# Patient Record
Sex: FEMALE | Race: WHITE | NOT HISPANIC OR LATINO | Employment: FULL TIME | URBAN - METROPOLITAN AREA
[De-identification: names, ages, dates, MRNs, and addresses within clinical notes are randomized per-mention and may not be internally consistent; named-entity substitution may affect disease eponyms.]

---

## 2017-01-26 ENCOUNTER — TELEPHONE (OUTPATIENT)
Dept: OTHER | Facility: CLINIC | Age: 52
End: 2017-01-26

## 2017-01-26 NOTE — TELEPHONE ENCOUNTER
1/26/2017    Call Regarding Onboarding [employer]    Attempt 1    Message on voicemail     Comments: 0 Dep      Outreach   KV

## 2017-02-01 NOTE — TELEPHONE ENCOUNTER
Call Regarding Onboarding [employer]    Attempt 1    Message on voicemail     Comments:       Outreach   Pooja Comer

## 2017-03-07 NOTE — TELEPHONE ENCOUNTER
3/7/2017    Call Regarding Onboarding [employer]    Attempt 3    Message on voicemail     Comments: 0 -DEP      Outreach   KV

## 2017-09-16 ENCOUNTER — HEALTH MAINTENANCE LETTER (OUTPATIENT)
Age: 52
End: 2017-09-16

## 2018-04-11 ENCOUNTER — RADIANT APPOINTMENT (OUTPATIENT)
Dept: MAMMOGRAPHY | Facility: CLINIC | Age: 53
End: 2018-04-11
Payer: COMMERCIAL

## 2018-04-11 ENCOUNTER — OFFICE VISIT (OUTPATIENT)
Dept: ORTHOPEDICS | Facility: CLINIC | Age: 53
End: 2018-04-11
Payer: COMMERCIAL

## 2018-04-11 VITALS
SYSTOLIC BLOOD PRESSURE: 128 MMHG | HEIGHT: 66 IN | WEIGHT: 224 LBS | BODY MASS INDEX: 36 KG/M2 | DIASTOLIC BLOOD PRESSURE: 82 MMHG

## 2018-04-11 DIAGNOSIS — Z12.31 VISIT FOR SCREENING MAMMOGRAM: ICD-10-CM

## 2018-04-11 DIAGNOSIS — M79.674 PAIN OF TOE OF RIGHT FOOT: Primary | ICD-10-CM

## 2018-04-11 NOTE — PROGRESS NOTES
CHIEF COMPLAINT:  New Patient (Right big toe numbness)       HISTORY OF PRESENT ILLNESS  Ms. Garcia is a pleasant 52 year old year old female who presents to clinic today with decreased sensation of medial aspect of right great toe.  Decreased sensation of small area of medial aspect of great toe adjacent toenail about 3 weeks ago. Denies any trauma or inciting event. Notes that she was wondering if this could be caused by her shoe wear.  No erythema, no bruising.  +pruritus at great toe.    No low back pain.  No radicular pain extending down lower extremities.  No foot pain or pain at small area of decreased sensation.      Additional history: as documented    MEDICAL HISTORY  -Asthma, mild intermittent  -Hypertension  -Insomnia  -CHEO  -Mild depression, hx     Patient Active Problem List   Diagnosis     Ear ringing       Current Outpatient Prescriptions   Medication Sig Dispense Refill     albuterol (PROAIR HFA, PROVENTIL HFA, VENTOLIN HFA) 108 (90 BASE) MCG/ACT inhaler Inhale 2 puffs into the lungs every 6 hours       ALPRAZolam (XANAX) 0.25 MG tablet Take 0.25 mg by mouth 2 times daily       atenolol (TENORMIN) 50 MG tablet Take 75 mg by mouth daily       mirtazapine (REMERON) 15 MG tablet Take 15 mg by mouth At Bedtime       oxyCODONE-acetaminophen (PERCOCET) 5-325 MG per tablet Take 1-2 tablets by mouth every 6 hours as needed for moderate to severe pain (Patient not taking: Reported on 4/11/2018) 5 tablet 0       Allergies   Allergen Reactions     Bees Swelling     Localized swelling     Scopolamine Other (See Comments)     Blurred vision, dry mouth, motor skills affected     Sulfa Drugs Nausea and Vomiting     Ivp Dye [Contrast Dye] Rash       No family history on file.    Additional medical/Social/Surgical histories reviewed in [facility] and updated as appropriate.     REVIEW OF SYSTEMS (4/11/2018)  CONSTITUTIONAL: Denies fever and weight loss  EYES: Denies acute vision changes  ENT: Denies hearing changes or  "difficulty swallowing  CARDIAC: Denies chest pain or edema  RESPIRATORY: Denies dyspnea, cough or wheeze  GASTROINTESTINAL: Denies abdominal pain, nausea, vomiting  MUSCULOSKELETAL: See HPI  SKIN: Denies any recent rash or lesion  NEUROLOGICAL: Denies numbness or focal weakness  PSYCHIATRIC: No history of psychiatric symptoms or problems  ENDOCRINE: Diagnosis of diabetes:NO  HEMATOLOGY: Denies episodes of easy bleeding      PHYSICAL EXAM  /82  Ht 1.676 m (5' 6\")  Wt 101.6 kg (224 lb)  BMI 36.15 kg/m2    General  - normal appearance, in no obvious distress  CV  - normal pulses at posterior tib and dorsalis pedis  Pulm  - normal respiratory pattern, non-labored    Musculoskeletal - Lumbar  - Full painless lumbar ROM  - Non tender to palpation of lumbar spine  - Negative SLR test  - Negative slump test  - 2+ DTR of patella and achilles reflexes  Musculoskeletal - Right lower extremity/foot  - stance: normal gait without limp, normal stance without excessive pronation, normal heel inversion with standing heel raise, no obvious leg length discrepancy, normal heel and toe walk  - inspection: no swelling or effusion,  normal bone and joint alignment, no obvious deformity  - palpation: no bony or soft tissue tenderness. Great toe without tenderness to palpation.    - ROM: normal active and passive ROM of great and lesser toes, no pain with MT translation  - strength: 5/5 in all planes  Neuro  - 5/5 hallux extension, grossly normal coordination, normal muscle tone  - 5/5 strength of knee and ankle and hip.  - Decreased sensation at medial aspect of right great toe adjacent nail fold. Appx 1.5 x 1cm region  - Normal pressure sensation of right great toe  - Normal sensation of plantar and dorsal aspect of toe and foot. Normal sensation of medial great toe and foot proximal to region of decreased sensation.    - Monofilament test normal at 9 points of plantar aspect of right foot.  Skin  - no ecchymosis, erythema, " warmth, or induration, no obvious rash  Psych  - interactive, appropriate, normal mood and affect     ASSESSMENT & PLAN  Ms. Garcia is a 52 year old year old female who presents to clinic today with small region of medial aspect of right great toe.  Not consistent with lumbar stenosis, peripheral neuropathy.  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  Patient instructed to change shoe wear, larger toe box.  Continue to monitor toe for return of sensation vs. Progressive loss.  May use callus pad at medial aspect of toe to avoid friction.  Symptom diary for any back pain or lower extremity pain/tingling.      Diagnosis:   Sensory disturbance of right great toe.     -Follow up with Podiatry 4 weeks    It was a pleasure seeing Deana today.    Kashmir Reyes DO, CAQSM  Primary Care Sports Medicine

## 2018-04-11 NOTE — PATIENT INSTRUCTIONS
Shoewear:    Athletic shoes with good stability sole. Toe box with room for movement.  Nike, new banance, Hoka  Vionic Sandals    Symptom diary for shooting pain down leg or changes

## 2018-04-11 NOTE — MR AVS SNAPSHOT
After Visit Summary   4/11/2018    Deana Garcia    MRN: [id number]           Patient Information     Date Of Birth          1965        Visit Information        Provider Department      4/11/2018 8:50 AM Kashmir Reyes DO [facility]        Today's Diagnoses     Pain of toe of right foot    -  1      Care Instructions    Shoewear:    Athletic shoes with good stability sole. Toe box with room for movement.  Nike, new banance, Hoka  Vionic Sandals    Symptom diary for shooting pain down leg              Follow-ups after your visit        Additional Services     ORTHOPEDICS ADULT REFERRAL       Your provider has referred you to: Podiatry, Dr. Baker    Please be aware that coverage of these services is subject to the terms and limitations of your health insurance plan.  Call member services at your health plan with any benefit or coverage questions.      Please bring the following to your appointment:    >>   Any x-rays, CTs or MRIs which have been performed.  Contact the facility where they were done to arrange for  prior to your scheduled appointment.    >>   List of current medications   >>   This referral request   >>   Any documents/labs given to you for this referral                  Who to contact     Please call your clinic at [phone] to:    Ask questions about your health    Make or cancel appointments    Discuss your medicines    Learn about your test results    Speak to your doctor            Additional Information About Your Visit        [employer] Information     [employer] gives you secure access to your electronic health record. If you see a primary care provider, you can also send messages to your care team and make appointments. If you have questions, please call your primary care clinic.  If you do not have a primary care provider, please call [phone] and they will assist you.      [employer] is an electronic gateway that provides easy,  "online access to your medical records. With [employer], you can request a clinic appointment, read your test results, renew a prescription or communicate with your care team.     To access your existing account, please contact your [facility] or call [phone] for assistance.        Care EveryWhere ID     This is your Care EveryWhere ID. This could be used by other organizations to access your [facility] medical records  [id number]        Your Vitals Were     Height BMI (Body Mass Index)                1.676 m (5' 6\") 36.15 kg/m2           Blood Pressure from Last 3 Encounters:   04/11/18 128/82   07/31/15 127/87   09/10/12 116/87    Weight from Last 3 Encounters:   04/11/18 101.6 kg (224 lb)   07/31/15 96.2 kg (212 lb 1.3 oz)   09/10/12 90.7 kg (200 lb)              We Performed the Following     ORTHOPEDICS ADULT REFERRAL        Primary Care Provider Office Phone # Fax #    Cabrera Valentine [phone]       [place] [address]        Equal Access to Services     [facility]: Hadii destiny Saldana, waushada cristina, qapreetita angelicaaldylon conley, adama ambrosio . So [facility] [phone].    ATENCIÓN: Si habla español, tiene a rocha disposición servicios gratuitos de asistencia lingüística. [facility] [phone].    We comply with applicable federal civil rights laws and [place] laws. We do not discriminate on the basis of race, color, national origin, age, disability, sex, sexual orientation, or gender identity.            Thank you!     Thank you for choosing [facility]  for your care. Our goal is always to provide you with excellent care. Hearing back from our patients is one way we can continue to improve our services. Please take a few minutes to complete the written survey that you may receive in the mail after your visit with us. Thank you!           "   Your Updated Medication List - Protect others around you: Learn how to safely use, store and throw away your medicines at www.disposemymeds.org.          This list is accurate as of 4/11/18  9:38 AM.  Always use your most recent med list.                   Brand Name Dispense Instructions for use Diagnosis    albuterol 108 (90 Base) MCG/ACT Inhaler    PROAIR HFA/PROVENTIL HFA/VENTOLIN HFA     Inhale 2 puffs into the lungs every 6 hours        ALPRAZolam 0.25 MG tablet    XANAX     Take 0.25 mg by mouth 2 times daily        atenolol 50 MG tablet    TENORMIN     Take 75 mg by mouth daily        mirtazapine 15 MG tablet    REMERON     Take 15 mg by mouth At Bedtime        oxyCODONE-acetaminophen 5-325 MG per tablet    PERCOCET    5 tablet    Take 1-2 tablets by mouth every 6 hours as needed for moderate to severe pain    Endometrial polyp, Status post D&C

## 2018-04-11 NOTE — LETTER
4/11/2018       RE: Deana Garcia  [address]  [address]  [facility] [address]     Dear Colleague,    Thank you for referring your patient, Deana Garcia, to the [facility] CLINIC at [facility]. Please see a copy of my visit note below.          SPORTS & ORTHOPEDIC WALK-IN VISIT 4/11/2018    Primary Care Physician:      Numbness/lack of sensation in the skin on the top of her right big toe for the past 2 weeks. Patch is medial to the toenail. She notes she can feel pressure and has tested her blood flow which appears normal. She has dead nerve endings on her abdominal region and states it feels the same. She notices it less on the weekends when she is not on her feet as much and doesn't wear shoes as often. She has plantar fascitis and has been wearing specific shoes and wonders if that could have something to do with it.    Reason for visit:     What part of your body is injured / painful?  right big toe numbness     What caused the injury /pain? Unsure    How long ago did your injury occur or pain begin? 2 weeks     What are your most bothersome symptoms? Numbness    How would you characterize your symptom?  Numbness    What makes your symptoms better? Nothing    What makes your symptoms worse? Unsure     Have you been previously seen for this problem? No    Medical History:    Any recent changes to your medical history? No    Any new medication prescribed since last visit? No    Have you had surgery on this body part before? No    Social History:    Occupation: Office job - grad      Handedness: Right    Exercise: 1-2 days/week    Review of Systems:    Do you have fever, chills, weight loss? No    Do you have any vision problems? No    Do you have any chest pain or edema? No    Do you have any shortness of breath or wheezing?  No    Do you have stomach problems? No    Do you have any numbness or focal weakness? Yes, in  toe     Do you have diabetes? No    Do you have problems with bleeding or clotting? No, but hx of blood clots     Do you have an rashes or other skin lesions? No           CHIEF COMPLAINT:  New Patient (Right big toe numbness)       HISTORY OF PRESENT ILLNESS  Ms. Garcia is a pleasant 52 year old year old female who presents to clinic today with decreased sensation of medial aspect of right great toe.  Decreased sensation of small area of medial aspect of great toe adjacent toenail about 3 weeks ago. Denies any trauma or inciting event. Notes that she was wondering if this could be caused by her shoe wear.  No erythema, no bruising.  +pruritus at great toe.    No low back pain.  No radicular pain extending down lower extremities.  No foot pain or pain at small area of decreased sensation.      Additional history: as documented    MEDICAL HISTORY  -Asthma, mild intermittent  -Hypertension  -Insomnia  -CHEO  -Mild depression, hx     Patient Active Problem List   Diagnosis     Ear ringing       Current Outpatient Prescriptions   Medication Sig Dispense Refill     albuterol (PROAIR HFA, PROVENTIL HFA, VENTOLIN HFA) 108 (90 BASE) MCG/ACT inhaler Inhale 2 puffs into the lungs every 6 hours       ALPRAZolam (XANAX) 0.25 MG tablet Take 0.25 mg by mouth 2 times daily       atenolol (TENORMIN) 50 MG tablet Take 75 mg by mouth daily       mirtazapine (REMERON) 15 MG tablet Take 15 mg by mouth At Bedtime       oxyCODONE-acetaminophen (PERCOCET) 5-325 MG per tablet Take 1-2 tablets by mouth every 6 hours as needed for moderate to severe pain (Patient not taking: Reported on 4/11/2018) 5 tablet 0       Allergies   Allergen Reactions     Bees Swelling     Localized swelling     Scopolamine Other (See Comments)     Blurred vision, dry mouth, motor skills affected     Sulfa Drugs Nausea and Vomiting     Ivp Dye [Contrast Dye] Rash       No family history on file.    Additional medical/Social/Surgical histories reviewed in [facility] and  "updated as appropriate.     REVIEW OF SYSTEMS (4/11/2018)  CONSTITUTIONAL: Denies fever and weight loss  EYES: Denies acute vision changes  ENT: Denies hearing changes or difficulty swallowing  CARDIAC: Denies chest pain or edema  RESPIRATORY: Denies dyspnea, cough or wheeze  GASTROINTESTINAL: Denies abdominal pain, nausea, vomiting  MUSCULOSKELETAL: See HPI  SKIN: Denies any recent rash or lesion  NEUROLOGICAL: Denies numbness or focal weakness  PSYCHIATRIC: No history of psychiatric symptoms or problems  ENDOCRINE: Diagnosis of diabetes:NO  HEMATOLOGY: Denies episodes of easy bleeding      PHYSICAL EXAM  /82  Ht 1.676 m (5' 6\")  Wt 101.6 kg (224 lb)  BMI 36.15 kg/m2    General  - normal appearance, in no obvious distress  CV  - normal pulses at posterior tib and dorsalis pedis  Pulm  - normal respiratory pattern, non-labored    Musculoskeletal - Lumbar  - Full painless lumbar ROM  - Non tender to palpation of lumbar spine  - Negative SLR test  - Negative slump test  - 2+ DTR of patella and achilles reflexes  Musculoskeletal - Right lower extremity/foot  - stance: normal gait without limp, normal stance without excessive pronation, normal heel inversion with standing heel raise, no obvious leg length discrepancy, normal heel and toe walk  - inspection: no swelling or effusion,  normal bone and joint alignment, no obvious deformity  - palpation: no bony or soft tissue tenderness. Great toe without tenderness to palpation.    - ROM: normal active and passive ROM of great and lesser toes, no pain with MT translation  - strength: 5/5 in all planes  Neuro  - 5/5 hallux extension, grossly normal coordination, normal muscle tone  - 5/5 strength of knee and ankle and hip.  - Decreased sensation at medial aspect of right great toe adjacent nail fold. Appx 1.5 x 1cm region  - Normal pressure sensation of right great toe  - Normal sensation of plantar and dorsal aspect of toe and foot. Normal sensation of medial " great toe and foot proximal to region of decreased sensation.    - Monofilament test normal at 9 points of plantar aspect of right foot.  Skin  - no ecchymosis, erythema, warmth, or induration, no obvious rash  Psych  - interactive, appropriate, normal mood and affect     ASSESSMENT & PLAN  Ms. Garcia is a 52 year old year old female who presents to clinic today with small region of medial aspect of right great toe.  Not consistent with lumbar stenosis, peripheral neuropathy.  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  Patient instructed to change shoe wear, larger toe box.  Continue to monitor toe for return of sensation vs. Progressive loss.  May use callus pad at medial aspect of toe to avoid friction.  Symptom diary for any back pain or lower extremity pain/tingling.      Diagnosis:   Sensory disturbance of right great toe.     -Follow up with Podiatry 4 weeks    It was a pleasure seeing Deana today.    Kashmir Reyes DO, CAQSM  Primary Care Sports Medicine

## 2018-04-11 NOTE — PROGRESS NOTES
SPORTS & ORTHOPEDIC WALK-IN VISIT 4/11/2018    Primary Care Physician:      Numbness/lack of sensation in the skin on the top of her right big toe for the past 2 weeks. Patch is medial to the toenail. She notes she can feel pressure and has tested her blood flow which appears normal. She has dead nerve endings on her abdominal region and states it feels the same. She notices it less on the weekends when she is not on her feet as much and doesn't wear shoes as often. She has plantar fascitis and has been wearing specific shoes and wonders if that could have something to do with it.    Reason for visit:     What part of your body is injured / painful?  right big toe numbness     What caused the injury /pain? Unsure    How long ago did your injury occur or pain begin? 2 weeks     What are your most bothersome symptoms? Numbness    How would you characterize your symptom?  Numbness    What makes your symptoms better? Nothing    What makes your symptoms worse? Unsure     Have you been previously seen for this problem? No    Medical History:    Any recent changes to your medical history? No    Any new medication prescribed since last visit? No    Have you had surgery on this body part before? No    Social History:    Occupation: Office job - grad      Handedness: Right    Exercise: 1-2 days/week    Review of Systems:    Do you have fever, chills, weight loss? No    Do you have any vision problems? No    Do you have any chest pain or edema? No    Do you have any shortness of breath or wheezing?  No    Do you have stomach problems? No    Do you have any numbness or focal weakness? Yes, in toe     Do you have diabetes? No    Do you have problems with bleeding or clotting? No, but hx of blood clots     Do you have an rashes or other skin lesions? No

## 2018-09-09 ENCOUNTER — NURSE TRIAGE (OUTPATIENT)
Dept: NURSING | Facility: CLINIC | Age: 53
End: 2018-09-09

## 2018-09-09 NOTE — TELEPHONE ENCOUNTER
"    Reason for Disposition    [1] Redness or red streak around the injection site AND [2] begins > 48 hours after shot AND [3] no fever  (Exception: red area < 1 inch or 2.5 cm wide)    Additional Information    Negative: [1] Difficulty with breathing or swallowing AND [2] starts within 2 hours after injection    Negative: Difficult to awaken or acting confused (disoriented, slurred speech)    Negative: Unresponsive, passed out, or very weak    Negative: Sounds like a life-threatening emergency to the triager    Negative: Fever > 104 F (40 C)    Negative: [1] Fever > 101 F (38.3 C) AND [2] age > 60    Negative: [1] Fever > 101 F (38.3 C) AND [2] bedridden (e.g., nursing home patient, CVA, chronic illness, recovering from surgery)    Negative: [1] Fever > 100.5 F (38.1 C) AND [2] diabetes mellitus or weak immune system (e.g., HIV positive, cancer chemo, splenectomy, organ transplant, chronic steroids)    Negative: [1] Measles vaccine rash (onset day 6-12) AND [2] purple or blood-colored    Negative: Sounds like a severe, unusual reaction to the triager    Negative: [1] Redness or red streak around the injection site AND [2] begins > 48 hours after shot AND [3] fever    Answer Assessment - Initial Assessment Questions  1. SYMPTOMS: \"What is the main symptom?\" (e.g., redness, swelling, pain)       Redness at site, other red bumps  2. ONSET: \"When was the vaccine (shot) given?\" \"How much later did the __________ begin?\" (e.g., hours, days ago)       10 days ago  3. SEVERITY: \"How bad is it?\"       itchy  4. FEVER: \"Is there a fever?\" If so, ask: \"What is it, how was it measured, and when did it start?\"       denies  5. IMMUNIZATIONS GIVEN: \"What shots have you recently received?\"      Chicken pox  6. PAST REACTIONS: \"Have you reacted to immunizations before?\" If so, ask: \"What happened?\"      mild  7. OTHER SYMPTOMS: \"Do you have any other symptoms?\"      denies    Protocols used: IMMUNIZATION REACTIONS-ADULT-AH      "

## 2019-10-02 ENCOUNTER — TRANSFERRED RECORDS (OUTPATIENT)
Dept: HEALTH INFORMATION MANAGEMENT | Facility: CLINIC | Age: 54
End: 2019-10-02

## 2019-10-02 ENCOUNTER — MEDICAL CORRESPONDENCE (OUTPATIENT)
Dept: HEALTH INFORMATION MANAGEMENT | Facility: CLINIC | Age: 54
End: 2019-10-02

## 2019-11-05 ENCOUNTER — HEALTH MAINTENANCE LETTER (OUTPATIENT)
Age: 54
End: 2019-11-05

## 2019-11-13 ENCOUNTER — TRANSFERRED RECORDS (OUTPATIENT)
Dept: HEALTH INFORMATION MANAGEMENT | Facility: CLINIC | Age: 54
End: 2019-11-13

## 2020-01-30 ENCOUNTER — ANCILLARY PROCEDURE (OUTPATIENT)
Dept: MAMMOGRAPHY | Facility: CLINIC | Age: 55
End: 2020-01-30
Attending: INTERNAL MEDICINE
Payer: COMMERCIAL

## 2020-01-30 DIAGNOSIS — Z00.00 ENCOUNTER FOR ROUTINE ADULT HEALTH EXAMINATION WITHOUT ABNORMAL FINDINGS: ICD-10-CM

## 2020-01-30 DIAGNOSIS — Z12.31 VISIT FOR SCREENING MAMMOGRAM: ICD-10-CM

## 2020-04-24 ENCOUNTER — ANCILLARY PROCEDURE (OUTPATIENT)
Dept: ULTRASOUND IMAGING | Facility: CLINIC | Age: 55
End: 2020-04-24
Attending: OBSTETRICS & GYNECOLOGY
Payer: COMMERCIAL

## 2020-04-24 DIAGNOSIS — N92.6 IRREGULAR BLEEDING: ICD-10-CM

## 2020-09-08 ENCOUNTER — OFFICE VISIT (OUTPATIENT)
Dept: OPHTHALMOLOGY | Facility: CLINIC | Age: 55
End: 2020-09-08
Attending: OPHTHALMOLOGY
Payer: COMMERCIAL

## 2020-09-08 DIAGNOSIS — H52.13 HIGH MYOPIA, BOTH EYES: ICD-10-CM

## 2020-09-08 DIAGNOSIS — D31.31 CHOROIDAL NEVUS, RIGHT: ICD-10-CM

## 2020-09-08 DIAGNOSIS — H25.13 NUCLEAR SCLEROTIC CATARACT OF BOTH EYES: Primary | ICD-10-CM

## 2020-09-08 PROCEDURE — G0463 HOSPITAL OUTPT CLINIC VISIT: HCPCS | Mod: ZF

## 2020-09-08 PROCEDURE — 76519 ECHO EXAM OF EYE: CPT | Mod: ZF | Performed by: OPHTHALMOLOGY

## 2020-09-08 RX ORDER — FAMOTIDINE 20 MG
TABLET ORAL
COMMUNITY

## 2020-09-08 ASSESSMENT — VISUAL ACUITY
CORRECTION_TYPE: GLASSES
OD_CC+: -1
OS_CC: 20/25
OD_CC: 20/40
METHOD: SNELLEN - LINEAR
OS_CC+: -2

## 2020-09-08 ASSESSMENT — REFRACTION_WEARINGRX
OS_VBASE: DOWN
OD_VBASE: UP
OD_ADD: +2.75
OS_CYLINDER: +4.00
OD_CYLINDER: +3.00
OD_VPRISM: 1
OS_AXIS: 009
OS_ADD: +2.75
OS_SPHERE: -8.25
OS_VPRISM: 1 DOWN
OD_AXIS: 098
OD_SPHERE: -10.25

## 2020-09-08 ASSESSMENT — REFRACTION_MANIFEST
OD_SPHERE: -15.00
OD_AXIS: 098
OS_AXIS: 007
OS_CYLINDER: +4.00
OD_CYLINDER: +4.25
OS_SPHERE: -8.25

## 2020-09-08 ASSESSMENT — CONF VISUAL FIELD
METHOD: COUNTING FINGERS
OS_NORMAL: 1
OD_NORMAL: 1

## 2020-09-08 ASSESSMENT — SLIT LAMP EXAM - LIDS
COMMENTS: NORMAL
COMMENTS: NORMAL

## 2020-09-08 ASSESSMENT — TONOMETRY
OS_IOP_MMHG: 06
OD_IOP_MMHG: 08
IOP_METHOD: TONOPEN

## 2020-09-08 ASSESSMENT — EXTERNAL EXAM - RIGHT EYE: OD_EXAM: NORMAL

## 2020-09-08 ASSESSMENT — CUP TO DISC RATIO
OD_RATIO: 0.2
OS_RATIO: 0.2

## 2020-09-08 ASSESSMENT — EXTERNAL EXAM - LEFT EYE: OS_EXAM: NORMAL

## 2020-09-08 NOTE — PROGRESS NOTES
HPI:  Deana Garcia is a 54 year old female referred by Dr. Carrington for cataract evaluation. She has noticed a worsening blurring of the vision in her right eye, slowly progressive over the last 2 years or so. The blurring is more noticeable at distance than near. There is associated worsening glare with bright lights at night. Her current glasses don't help like they used to. No eye pain, redness, discharge. No flashes/floaters.    POH: Myopia, choroidal nevus right eye. No history of eye surgery or trauma  PMH: Asthma, No DM    Assessment & Plan     (H25.13) Nuclear sclerotic cataract of both eyes  (primary encounter diagnosis)  (H52.13) High myopia, both eyes  Comment: Visually significant with BCVA of 20/40 right eye and 20/25 left eye with large myopic shift right eye (SE of ~ -8.75 to -13.00). Moderately dense NS on exam.    Dilates to: 8 mm  Alpha blockers/Flomax: None  Trauma/Pseudoxfoliation: None  Fuchs dystrophy/guttae: None    Diabetes: No  Anticoagulation: None    Cyl: 3.10 @ 099 right eye, 2.82 @ 007 left eye     We discussed the risks and benefits of cataract surgery, and informed consent was obtained.  Proceed with CE/IOL right eye followed by left eye.     Surgical plan:  Topical  Aim ~ -3.00 for reading vision. She is considering toric IOL, will call to let me know.  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 Carrington's last documented exam.  Plan: Monitor        -----------------------------------------------------------------------------------    Patient disposition:   Return for scheduled procedure, or sooner as needed.    Teaching statement:  Complete documentation of historical and exam elements from today's encounter can be found in the full encounter summary report (not reduplicated in this progress note). I personally obtained the chief complaint(s) and history of present illness.  I confirmed and edited as necessary the review of systems,  past medical/surgical history, family history, social history, and examination findings as documented by others; and I examined the patient myself. I personally reviewed the relevant tests, images, and reports as documented above.     I formulated and edited as necessary the assessment and plan and discussed the findings and management plan with the patient and family.      Анна Mishra MD  Comprehensive Ophthalmology & Ocular Pathology  Department of Ophthalmology and Visual Neurosciences  [email or web address]  Pager [phone]

## 2020-09-08 NOTE — NURSING NOTE
Chief Complaints and History of Present Illnesses   Patient presents with     Cataract     Chief Complaint(s) and History of Present Illness(es)     Cataract     Laterality: right eye    Associated symptoms: blurred vision, glare and starburst    Severity: moderate    Onset: gradual    Duration: 2 years    Frequency: constant    Context: reading, night driving and computer work              Comments     For the past two years she has been experiencing decreased vision and glare.  She struggles with reading and decreased night vision.  Her right eye is significantly worse than her left eye.    Esther Goncalves, COT 9:01 AM  September 8, 2020

## 2020-09-21 ENCOUNTER — PREP FOR PROCEDURE (OUTPATIENT)
Dept: OPHTHALMOLOGY | Facility: CLINIC | Age: 55
End: 2020-09-21

## 2020-09-21 ENCOUNTER — TELEPHONE (OUTPATIENT)
Dept: OPHTHALMOLOGY | Facility: CLINIC | Age: 55
End: 2020-09-21

## 2020-09-21 NOTE — TELEPHONE ENCOUNTER
Called patient to schedule procedure with Dr. Mishra, there was no answer.  Left message with my direct line [phone]

## 2020-09-28 NOTE — TELEPHONE ENCOUNTER
Called patient to schedule procedure with Dr. Mishra, there was no answer.  Left message with my direct line [phone].

## 2020-09-29 NOTE — TELEPHONE ENCOUNTER
Called and talked with patient to schedule her for her eye procedure with Dr. Mishra.  Patient reports she is still trying to figure out what she wants to do.   Patient request to be called back in two weeks to be scheduled.

## 2020-11-22 ENCOUNTER — HEALTH MAINTENANCE LETTER (OUTPATIENT)
Age: 55
End: 2020-11-22

## 2021-05-18 NOTE — TELEPHONE ENCOUNTER
Patient called with concerns about her upcoming appointment with Dr. Mishra.    Patient is wanting to know does she still need to see Dr. Mishra before scheduling surgery.    A message has been sent to the patients care team.

## 2021-05-19 ENCOUNTER — OFFICE VISIT (OUTPATIENT)
Dept: OPHTHALMOLOGY | Facility: CLINIC | Age: 56
End: 2021-05-19
Attending: OPHTHALMOLOGY
Payer: COMMERCIAL

## 2021-05-19 DIAGNOSIS — H25.13 NUCLEAR SCLEROTIC CATARACT OF BOTH EYES: Primary | ICD-10-CM

## 2021-05-19 DIAGNOSIS — H52.223 REGULAR ASTIGMATISM OF BOTH EYES: ICD-10-CM

## 2021-05-19 PROCEDURE — 76519 ECHO EXAM OF EYE: CPT | Performed by: OPHTHALMOLOGY

## 2021-05-19 PROCEDURE — G0463 HOSPITAL OUTPT CLINIC VISIT: HCPCS

## 2021-05-19 PROCEDURE — 76519 ECHO EXAM OF EYE: CPT | Mod: 26 | Performed by: OPHTHALMOLOGY

## 2021-05-19 PROCEDURE — 99214 OFFICE O/P EST MOD 30 MIN: CPT | Performed by: OPHTHALMOLOGY

## 2021-05-19 ASSESSMENT — REFRACTION_WEARINGRX
OD_ADD: +2.75
OD_CYLINDER: +3.00
OS_ADD: +2.75
OD_VPRISM: 1
OS_VPRISM: 1 DOWN
OS_AXIS: 009
OS_SPHERE: -8.25
OD_VBASE: UP
OS_VBASE: DOWN
OD_SPHERE: -10.25
OD_AXIS: 098
OS_CYLINDER: +4.00

## 2021-05-19 ASSESSMENT — TONOMETRY
OS_IOP_MMHG: 15
OD_IOP_MMHG: 16
IOP_METHOD: TONOPEN

## 2021-05-19 ASSESSMENT — REFRACTION_MANIFEST
OS_AXIS: 009
OD_SPHERE: -10.75
OS_CYLINDER: +4.00
OD_CYLINDER: +2.50
OS_SPHERE: -8.50
OD_AXIS: 100
OS_ADD: +2.50
OD_ADD: +2.50

## 2021-05-19 ASSESSMENT — CONF VISUAL FIELD
OS_NORMAL: 1
METHOD: COUNTING FINGERS
OD_NORMAL: 1

## 2021-05-19 ASSESSMENT — CUP TO DISC RATIO
OD_RATIO: 0.2
OS_RATIO: 0.2

## 2021-05-19 ASSESSMENT — VISUAL ACUITY
OD_PH_CC+: -1
OD_PH_CC: 20/30
METHOD: SNELLEN - LINEAR
OS_CC: 20/30
OD_CC: 20/60
OD_CC+: -1
OS_PH_CC: 20/20

## 2021-05-19 ASSESSMENT — EXTERNAL EXAM - RIGHT EYE: OD_EXAM: NORMAL

## 2021-05-19 ASSESSMENT — EXTERNAL EXAM - LEFT EYE: OS_EXAM: NORMAL

## 2021-05-19 ASSESSMENT — SLIT LAMP EXAM - LIDS
COMMENTS: NORMAL
COMMENTS: NORMAL

## 2021-05-19 NOTE — NURSING NOTE
Chief Complaints and History of Present Illnesses   Patient presents with     Cataract     Chief Complaint(s) and History of Present Illness(es)     Cataract     Laterality: both eyes              Comments     Deana is here to continue care for Nuclear sclerotic cataract of both eyes. She is here for re testing and Calcs.      Shankar House COT 9:48 AM May 19, 2021

## 2021-05-19 NOTE — PROGRESS NOTES
HPI:  Deana Garcia is a 55 year old female here for repeat cataract evaluation. She meant to have surgery in the fall, but her blood pressure was elevated at her pre-op physical, and she had to delay. She has since been started on a BP medication, and at last check, her BP was improved. She has noticed continued worsening blurring of the vision in her right eye, and it is currently limiting her ability to see to work and drive. There is associated worsening glare with bright lights at night. Her current glasses don't help much at all. No eye pain, redness, discharge. No flashes/floaters.    POH: Myopia, choroidal nevus right eye. No history of eye surgery or trauma  PMH: Asthma, No DM    Assessment & Plan     (H25.13) Nuclear sclerotic cataract of both eyes  (primary encounter diagnosis)  (H52.13) High myopia, both eyes  Comment: Visually significant with BCVA of 20/40- right eye. Moderately dense NS on exam.    Dilates to: 8 mm  Alpha blockers/Flomax: None  Trauma/Pseudoxfoliation: None  Fuchs dystrophy/guttae: None    Diabetes: No  Anticoagulation: None    Cyl: 3.48 @ 101 right eye, 3.08 @ 007 left eye     We discussed the risks and benefits of cataract surgery, and informed consent was obtained.  Proceed with CE/IOL right eye followed by left eye (left eye for anisometropia)    Surgical plan:  Topical  Aim ~ -2.00 to -2.50 for reading/computer vision. She would like to proceed with faculty toric IOL in both eyes.   FACULTY    (D31.31) Choroidal nevus, right  Comment: No concerning features, stable  Plan: Monitor     -----------------------------------------------------------------------------------    Patient disposition:   Return for scheduled procedure, or sooner as needed.    Teaching statement:  Complete documentation of historical and exam elements from today's encounter can be found in the full encounter summary report (not reduplicated in this progress note). I personally obtained the chief complaint(s)  and history of present illness.  I confirmed and edited as necessary the review of systems, past medical/surgical history, family history, social history, and examination findings as documented by others; and I examined the patient myself. I personally reviewed the relevant tests, images, and reports as documented above.     I formulated and edited as necessary the assessment and plan and discussed the findings and management plan with the patient and family.      Анна Mishra MD  Comprehensive Ophthalmology & Ocular Pathology  Department of Ophthalmology and Visual Neurosciences  [email or web address]  Pager [phone]

## 2021-05-24 PROBLEM — H52.223 REGULAR ASTIGMATISM OF BOTH EYES: Status: ACTIVE | Noted: 2021-05-24

## 2021-05-24 PROBLEM — H25.13 NUCLEAR SCLEROTIC CATARACT OF BOTH EYES: Status: ACTIVE | Noted: 2021-05-24

## 2021-05-25 ENCOUNTER — TELEPHONE (OUTPATIENT)
Dept: OPHTHALMOLOGY | Facility: CLINIC | Age: 56
End: 2021-05-25

## 2021-05-25 NOTE — TELEPHONE ENCOUNTER
Spoke with patient to schedule left eye surgery with Dr. Mishra    Surgery was scheduled on 6/25 at [facility] [place]  Patient will have H&P at [facility]     Patient is aware a COVID-19 test is needed before their procedure. The test should be with-in 4 days of their procedure.   Test Details: Date 6/21 Location [facility]    Post-Op visit was scheduled on 7/5  Patient was advised a / is needed day of surgery. As well as, for 24 hours after their surgery procedure.  Surgery packet was mailed 5/25, patient has my direct contact information for any further questions [phone].

## 2021-05-27 DIAGNOSIS — Z11.59 ENCOUNTER FOR SCREENING FOR OTHER VIRAL DISEASES: ICD-10-CM

## 2021-06-14 DIAGNOSIS — Z11.59 ENCOUNTER FOR SCREENING FOR OTHER VIRAL DISEASES: ICD-10-CM

## 2021-06-14 LAB
LABORATORY COMMENT REPORT: NORMAL
SARS-COV-2 RNA RESP QL NAA+PROBE: NEGATIVE
SARS-COV-2 RNA RESP QL NAA+PROBE: NORMAL
SPECIMEN SOURCE: NORMAL
SPECIMEN SOURCE: NORMAL

## 2021-06-14 PROCEDURE — U0005 INFEC AGEN DETEC AMPLI PROBE: HCPCS | Mod: 90 | Performed by: PATHOLOGY

## 2021-06-14 PROCEDURE — U0003 INFECTIOUS AGENT DETECTION BY NUCLEIC ACID (DNA OR RNA); SEVERE ACUTE RESPIRATORY SYNDROME CORONAVIRUS 2 (SARS-COV-2) (CORONAVIRUS DISEASE [COVID-19]), AMPLIFIED PROBE TECHNIQUE, MAKING USE OF HIGH THROUGHPUT TECHNOLOGIES AS DESCRIBED BY CMS-2020-01-R: HCPCS | Mod: 90 | Performed by: PATHOLOGY

## 2021-06-16 DIAGNOSIS — H25.11 AGE-RELATED NUCLEAR CATARACT, RIGHT: Primary | ICD-10-CM

## 2021-06-16 RX ORDER — OFLOXACIN 3 MG/ML
SOLUTION/ DROPS OPHTHALMIC
Qty: 5 ML | Refills: 0 | Status: SHIPPED | OUTPATIENT
Start: 2021-06-16 | End: 2021-07-28

## 2021-06-16 RX ORDER — PREDNISOLONE ACETATE 10 MG/ML
SUSPENSION/ DROPS OPHTHALMIC
Qty: 5 ML | Refills: 1 | Status: SHIPPED | OUTPATIENT
Start: 2021-06-16 | End: 2021-07-28

## 2021-06-18 ENCOUNTER — NURSE TRIAGE (OUTPATIENT)
Dept: NURSING | Facility: CLINIC | Age: 56
End: 2021-06-18

## 2021-06-18 ENCOUNTER — TELEPHONE (OUTPATIENT)
Dept: OPHTHALMOLOGY | Facility: CLINIC | Age: 56
End: 2021-06-18

## 2021-06-18 ENCOUNTER — OFFICE VISIT (OUTPATIENT)
Dept: OPHTHALMOLOGY | Facility: CLINIC | Age: 56
End: 2021-06-18
Payer: COMMERCIAL

## 2021-06-18 ENCOUNTER — HOSPITAL ENCOUNTER (OUTPATIENT)
Facility: AMBULATORY SURGERY CENTER | Age: 56
Discharge: HOME | End: 2021-06-18
Attending: OPHTHALMOLOGY | Admitting: OPHTHALMOLOGY
Payer: COMMERCIAL

## 2021-06-18 VITALS
HEART RATE: 57 BPM | BODY MASS INDEX: 34.55 KG/M2 | RESPIRATION RATE: 16 BRPM | WEIGHT: 215 LBS | SYSTOLIC BLOOD PRESSURE: 152 MMHG | HEIGHT: 66 IN | DIASTOLIC BLOOD PRESSURE: 92 MMHG | TEMPERATURE: 97 F | OXYGEN SATURATION: 99 %

## 2021-06-18 DIAGNOSIS — H52.223 REGULAR ASTIGMATISM OF BOTH EYES: ICD-10-CM

## 2021-06-18 DIAGNOSIS — Z96.1 PSEUDOPHAKIA, RIGHT EYE: Primary | ICD-10-CM

## 2021-06-18 DIAGNOSIS — H25.11 AGE-RELATED NUCLEAR CATARACT, RIGHT: Primary | ICD-10-CM

## 2021-06-18 DIAGNOSIS — Z98.890 POSTOPERATIVE EYE STATE: ICD-10-CM

## 2021-06-18 DIAGNOSIS — H52.221 REGULAR ASTIGMATISM, RIGHT EYE: ICD-10-CM

## 2021-06-18 DIAGNOSIS — Z20.822 COVID-19 RULED OUT: Primary | ICD-10-CM

## 2021-06-18 DIAGNOSIS — H25.13 NUCLEAR SCLEROTIC CATARACT OF BOTH EYES: ICD-10-CM

## 2021-06-18 PROCEDURE — 66984 XCAPSL CTRC RMVL W/O ECP: CPT | Mod: RT

## 2021-06-18 PROCEDURE — V2599 CONTACT LENS/ES OTHER TYPE: HCPCS | Mod: DBP,RT

## 2021-06-18 PROCEDURE — 99024 POSTOP FOLLOW-UP VISIT: CPT | Mod: GC | Performed by: OPHTHALMOLOGY

## 2021-06-18 RX ORDER — BALANCED SALT SOLUTION 6.4; .75; .48; .3; 3.9; 1.7 MG/ML; MG/ML; MG/ML; MG/ML; MG/ML; MG/ML
SOLUTION OPHTHALMIC PRN
Status: DISCONTINUED | OUTPATIENT
Start: 2021-06-18 | End: 2021-06-18 | Stop reason: HOSPADM

## 2021-06-18 RX ORDER — DICLOFENAC SODIUM 1 MG/ML
1 SOLUTION/ DROPS OPHTHALMIC
Status: COMPLETED | OUTPATIENT
Start: 2021-06-18 | End: 2021-06-18

## 2021-06-18 RX ORDER — LIDOCAINE HYDROCHLORIDE 10 MG/ML
INJECTION, SOLUTION EPIDURAL; INFILTRATION; INTRACAUDAL; PERINEURAL PRN
Status: DISCONTINUED | OUTPATIENT
Start: 2021-06-18 | End: 2021-06-18 | Stop reason: HOSPADM

## 2021-06-18 RX ORDER — PROPARACAINE HYDROCHLORIDE 5 MG/ML
1 SOLUTION/ DROPS OPHTHALMIC ONCE
Status: COMPLETED | OUTPATIENT
Start: 2021-06-18 | End: 2021-06-18

## 2021-06-18 RX ORDER — TETRACAINE HYDROCHLORIDE 5 MG/ML
SOLUTION OPHTHALMIC PRN
Status: DISCONTINUED | OUTPATIENT
Start: 2021-06-18 | End: 2021-06-18 | Stop reason: HOSPADM

## 2021-06-18 RX ORDER — CYCLOPENTOLAT/TROPIC/PHENYLEPH 1%-1%-2.5%
1 DROPS (EA) OPHTHALMIC (EYE)
Status: COMPLETED | OUTPATIENT
Start: 2021-06-18 | End: 2021-06-18

## 2021-06-18 RX ORDER — OFLOXACIN 3 MG/ML
1 SOLUTION/ DROPS OPHTHALMIC
Status: COMPLETED | OUTPATIENT
Start: 2021-06-18 | End: 2021-06-18

## 2021-06-18 RX ORDER — MOXIFLOXACIN IN NACL,ISO-OS/PF 0.3MG/0.3
SYRINGE (ML) INTRAOCULAR PRN
Status: DISCONTINUED | OUTPATIENT
Start: 2021-06-18 | End: 2021-06-18 | Stop reason: HOSPADM

## 2021-06-18 RX ADMIN — OFLOXACIN 1 DROP: 3 SOLUTION/ DROPS OPHTHALMIC at 10:04

## 2021-06-18 RX ADMIN — PROPARACAINE HYDROCHLORIDE 1 DROP: 5 SOLUTION/ DROPS OPHTHALMIC at 09:53

## 2021-06-18 RX ADMIN — OFLOXACIN 1 DROP: 3 SOLUTION/ DROPS OPHTHALMIC at 09:52

## 2021-06-18 RX ADMIN — Medication 1 DROP: at 10:00

## 2021-06-18 RX ADMIN — Medication 1 DROP: at 09:52

## 2021-06-18 RX ADMIN — DICLOFENAC SODIUM 1 DROP: 1 SOLUTION/ DROPS OPHTHALMIC at 10:00

## 2021-06-18 RX ADMIN — Medication 1 DROP: at 10:04

## 2021-06-18 RX ADMIN — OFLOXACIN 1 DROP: 3 SOLUTION/ DROPS OPHTHALMIC at 10:00

## 2021-06-18 RX ADMIN — DICLOFENAC SODIUM 1 DROP: 1 SOLUTION/ DROPS OPHTHALMIC at 10:04

## 2021-06-18 RX ADMIN — DICLOFENAC SODIUM 1 DROP: 1 SOLUTION/ DROPS OPHTHALMIC at 09:52

## 2021-06-18 ASSESSMENT — VISUAL ACUITY: OD_SC: 20/50

## 2021-06-18 ASSESSMENT — EXTERNAL EXAM - RIGHT EYE: OD_EXAM: NORMAL

## 2021-06-18 ASSESSMENT — MIFFLIN-ST. JEOR: SCORE: 1576.98

## 2021-06-18 ASSESSMENT — TONOMETRY
OD_IOP_MMHG: 6
IOP_METHOD: TONOPEN

## 2021-06-18 ASSESSMENT — SLIT LAMP EXAM - LIDS: COMMENTS: NORMAL

## 2021-06-18 NOTE — TELEPHONE ENCOUNTER
----- Message from Jannet Eugene sent at 6/18/2021  7:27 AM CDT -----  Regarding: Appt 6/21 - No Covid Swab order  Hello,   In order to offer our patients the best possible experience, the lab schedule is reviewed to ensure patients have lab orders in [facility] prior to arriving at the lab.    Please have one of your team members place a Covid Swab order in Epic for this patient prior to the lab appointment date.     Thank you for your attention,   [facility] [phone]

## 2021-06-18 NOTE — PROGRESS NOTES
POD#0, status post cataract surgery, RIGHT eye    No complaints.  Denies eye pain.    Impression/Plan:  Pseudophakia, OD: POD0, good post-operative appearance. IOP reasonable.    Doing well. Start post-op drops as directed.    Eye protection at all times and eye shield at night for 1 week.    Limited activities with no exercise or heavy lifting for 1 week.    Instructed patient to contact us for decreasing vision, eye pain, new floaters or flashes of light or other concerning symptoms.    Written instructions given    Return to for left eye ce/iol and post-op check as scheduled, sooner if needed.    Paul Gallagher MD  Ophthalmology PGY4    Teaching statement:  Complete documentation of historical and exam elements from today's encounter can be found in the full encounter summary report (not reduplicated in this progress note). I personally obtained the chief complaint(s) and history of present illness.  I confirmed and edited as necessary the review of systems, past medical/surgical history, family history, social history, and examination findings as documented by others; and I examined the patient myself. I personally reviewed the relevant tests, images, and reports as documented above.     I formulated and edited as necessary the assessment and plan and discussed the findings and management plan with the patient and family.    Анна Mishra MD  Comprehensive Ophthalmology & Ocular Pathology  Department of Ophthalmology and Visual Neurosciences  [email or web address]  Pager [phone]

## 2021-06-18 NOTE — TELEPHONE ENCOUNTER
6/25/2021  LEFT EYE CATARACT REMOVAL WITH INTRAOCULAR TORIC LENS IMPLANT    Pt was getting into the car and hit the right side of her head above her right ear.     It was not very hard.   No injuries or bleeding noted.    But Pt is worried that when she hit her head.    She managed to shake the implant loose from her eye surgery.       Pt is not in any pain or bleeding noted.  But very worried she did some damages to the fresh surgery.       Pt would like a call back from the clinic.    Please call the Pt back @ [phone] for further assistance.     Thank you     Reason for Disposition    Patient wants to be seen    Additional Information    Negative: ACUTE NEUROLOGIC SYMPTOM and symptom present now    Negative: Knocked out (unconscious) > 1 minute    Negative: Seizure (convulsion) occurred (Exception: prior history of seizures and now alert and without Acute Neurologic Symptoms)    Negative: Neck pain after dangerous injury (e.g., MVA, diving, trampoline, contact sports, fall > 10 feet or 3 meters) (Exception: neck pain began > 1 hour after injury)    Negative: Major bleeding (actively dripping or spurting) that can't be stopped    Negative: Penetrating head injury (e.g., knife, gunshot wound, metal object)    Negative: Sounds like a life-threatening emergency to the triager    Negative: Recently examined and diagnosed with a concussion by a healthcare provider and has questions about concussion symptoms    Negative: Can't remember what happened (amnesia)    Negative: Vomiting once or more    Negative: Watery or blood-tinged fluid dripping from the nose or ears    Negative: ACUTE NEUROLOGIC SYMPTOM and now fine    Negative: Knocked out (unconscious) < 1 minute and now fine    Negative: Severe headache    Negative: Dangerous injury (e.g., MVA, diving, trampoline, contact sports, fall > 10 feet or 3 meters) or severe blow from hard object (e.g., golf club or baseball bat)    Negative: Large swelling or bruise > 2  inches (5 cm)    Negative: Skin is split open or gaping (length > 1/2 inch or 12 mm)    Negative: Bleeding won't stop after 10 minutes of direct pressure (using correct technique)    Negative: One or two 'black eyes' (bruising, purple color of eyelids), and onset within 24 hours of head injury    Negative: Taking Coumadin (warfarin) or other strong blood thinner, or known bleeding disorder (e.g., thrombocytopenia)    Negative: Age over 65 years with and area of head swelling or bruise    Negative: Sounds like a serious injury to the triager    Negative: Patient is confused or is an unreliable provider of information (e.g., dementia, severe intellectual disability, alcohol intoxication)    Negative: No prior tetanus shots (or is not fully vaccinated) and any wound (e.g., cut or scrape)    Negative: HIV positive or severe immunodeficiency (severely weak immune system) and DIRTY cut or scrape    Protocols used: HEAD INJURY-A-OH

## 2021-06-18 NOTE — TELEPHONE ENCOUNTER
[phone]    S/p cataract surgery today and while getting into care hit head by ear-- a bit of a jolt-- no loss of consciousness and head ok per pt    Pt concerned if implant moved    No noticed vision changes-- same amount of blurred vision    Eye is shielded    Dr. Mishra aware and will call pt to review plan    Gabriel Mendoza, RN 2:09 PM 06/18/21

## 2021-06-18 NOTE — DISCHARGE INSTRUCTIONS
[facility]  Home Care Following Your Procedure  Call a doctor if you have signs of infection (fever, growing tenderness at the surgery site, a large amount of drainage or bleeding, severe pain, foul-smelling drainage, redness, swelling).         Tylenol/Acetaminophen Consumption  To help encourage the safe use of acetaminophen, the makers of TYLENOL  have lowered the maximum daily dose for single-ingredient Extra Strength TYLENOL  (acetaminophen) products sold in the [place] from 8 pills per day (4,000 mg) to 6 pills per day (3,000 mg). The dosing interval has also changed from 2 pills every 4-6 hours to 2 pills every 6 hours.    If you feel your pain relief is insufficient, you may take Tylenol/Acetaminophen in addition to your narcotic pain medication.     Be careful not to exceed 3,000 mg of Tylenol/Acetaminophen in a 24 hour period from all sources.    If you are taking extra strength Tylenol/acetaminophen (500 mg), the maximum dose is 6 tablets in 24 hours.    If you are taking regular strength acetaminophen (325 mg), the maximum dose is 9 tablets in 24 hours.  Your doctor is:  Dr. Анна Mishra, Ophthalmology: [phone]                                    Or dial [phone] and ask for the resident on call for:  Ophthalmology  For emergency care, call the:  [place]:  [phone] (TTY for hearing impaired: [phone])

## 2021-06-18 NOTE — PROCEDURES
Ophthalmology Post-op Procedure Note    Surgical Service:    Ophthalmology & Visual Sciences  Date Performed:      June 18, 2021  Location: [facility]      Pre-operative Diagnosis: Visually significant cataract, right eye  Post-operative Diagnosis:  Pseudophakia, right eye  Operative Procedure:  Phacoemulsification with intraocular lens implantation, right eye      Surgeon(s):  Fellow/Staff Surgeon:       Анна Mishra MD  Resident Surgeon:            None    Anesthesia:   Topical/MAC  Findings:  No unusual findings   Blood Loss:    Minimal  Implants:  SN6AT8 13.5 intraocular lens  Specimens:  None     Complications:  The patient did not experience any complications.   Condition: Stable    Operative Report Completion:    Description of Operation/Procedure:    After appropriate informed consent was obtained, the 6:00 position was marked with surgical marking pen in the pre-operative area. Tthe patient was brought to the operating room. The appropriate cardiac and blood pressure monitors were placed. A final pause occurred just before the start of the procedure during which the entire procedure team actively confirmed the correct patient, procedure, site, special equipment and special requirements.The patient was prepped and draped in the usual sterile fashion using 5% povidone/iodine.     An eyelid speculum was placed to open the eyelids. A paracentesis port was placed approximately sixty degrees to the left of the planned temporal incision location using the sideport blade. Approximately 0.8 cc of 1% nonpreserved lidocaine was placed into the anterior chamber. The anterior chamber was filled with dispersive viscoelastic. A clear corneal temporal incision was made with a metal 2.6 mm keratome. A round continuous tear capsulorhexis was initiated with a cystotome and completed with the Utrata forceps. Balanced salt solution on a cannula was used to perform hydrodissection. The nucleus was removed using  phacoemulsification with a chop technique. The remaining cortical material was removed using the irrigation/aspiration handpiece. The capsular bag was filled with cohesive viscoelastic. The 099 degree axis was marked on the cornea with the toric marker. A lens of the model and power listed above was placed into the capsular bag using the cartridge injection system and the toric lens axis aligned with the corneal markings.  The remaining viscoelastic was removed using the irrigation aspiration handpiece. The paracentesis and temporal wounds were hydrated with balanced salt solution. Intracameral moxifloxacin was administered. At the conclusion of the case, the wounds were felt to be watertight, the pupil was round, the lens was centered, and the anterior chamber was deep. A few drops of antibiotic and prednisolone were given to the operative eye. The eyelid speculum was removed. A shield was placed over the operative eye.    Attending Attestation:  I was present for and performed the entire procedure.  Анна Mishra MD

## 2021-06-19 ENCOUNTER — TELEPHONE (OUTPATIENT)
Dept: OPHTHALMOLOGY | Facility: CLINIC | Age: 56
End: 2021-06-19

## 2021-06-19 NOTE — TELEPHONE ENCOUNTER
"Telephone Encounter:    Patient is a 56 y/o F s/p CE IOL yesterday with Dr. Mishra.     She reports that she can something inside her pupil when she looks in the mirror that is flashing and a hyper-reflective thing moving inside of her eye. She also notices that she feels \"flashes\" of lights with her pupils kelvin in and out and \"throbbing\" - without pain.     She reports that her vision is intact. She is not having any eye pain. She reports symptoms started yesterday a few hours after surgery. She reports no scotomas. No new floaters. She reports that she is not seeing a flash of light in her light, but feels that her pupil is kelvin and letting in more or less light. She reports that these episodes lasts for a few minutes then go away and recur. She also reports that she looked in the mirror and saw something that is hyper-reflective inside of her eye.    No red flag symptoms present at this juncture - we discussed that she just had surgery yesterday and is still in the healing process and things may be swollen causing abnormal visual phenomenon and the lens is inside her eye which may be what she is seeing.     Discussed signs and symptoms to be aware of with this - including worsening eye pain, worsening vision changes, new scotomas in his eye.    - Will alert Dr. Mishra regarding symptoms to follow-up on Monday     Discussed with Pérez Ling MD  Department of Ophthalmology  Pager: [phone]    "

## 2021-06-20 ENCOUNTER — TELEPHONE (OUTPATIENT)
Dept: OPHTHALMOLOGY | Facility: CLINIC | Age: 56
End: 2021-06-20

## 2021-06-20 NOTE — TELEPHONE ENCOUNTER
Telephone Encounter:    Patient called today with continued symptoms from yesterday (please see telephone encounter from yesterday). She feels extremely anxious and worried about this, and is worried that this might be permanent. She continues to deny having any pain in her eye, changes to her vision, new bacilio flashes of lights, new floaters, scotomas or other acute changes. Symptoms resolve when she has her eyes closed.    We discussed that this might represent a positive dysphotopsia, although in the setting of having undergone surgery 2 days ago now it is hard to predict the long-term outcome this although post surgical changes are still present in her eye, and it is important to continue to monitor this. At this juncture, discussed that it is important for us to take a look at her eye to examine what might be going on, and that the avenue for examination today would be to have her come to the emergency room for evaluation. She does not want to come to the ED tonight.     Discussed that we would like to see her in clinic to further discuss what might be going on in her eyes.     Plan:  -We will touch base with Dr. Mishra given concerns  - Will have patient evaluated tomorrow in clinic for further evaluation.     Shiraz Ling MD  Department of Ophthalmology  Pager: [phone]

## 2021-06-21 ENCOUNTER — OFFICE VISIT (OUTPATIENT)
Dept: OPHTHALMOLOGY | Facility: CLINIC | Age: 56
End: 2021-06-21
Attending: OPHTHALMOLOGY
Payer: COMMERCIAL

## 2021-06-21 DIAGNOSIS — Z96.1 PSEUDOPHAKIA, RIGHT EYE: Primary | ICD-10-CM

## 2021-06-21 DIAGNOSIS — Z20.822 COVID-19 RULED OUT: ICD-10-CM

## 2021-06-21 PROCEDURE — U0003 INFECTIOUS AGENT DETECTION BY NUCLEIC ACID (DNA OR RNA); SEVERE ACUTE RESPIRATORY SYNDROME CORONAVIRUS 2 (SARS-COV-2) (CORONAVIRUS DISEASE [COVID-19]), AMPLIFIED PROBE TECHNIQUE, MAKING USE OF HIGH THROUGHPUT TECHNOLOGIES AS DESCRIBED BY CMS-2020-01-R: HCPCS | Mod: 90 | Performed by: PATHOLOGY

## 2021-06-21 PROCEDURE — G0463 HOSPITAL OUTPT CLINIC VISIT: HCPCS

## 2021-06-21 PROCEDURE — 99024 POSTOP FOLLOW-UP VISIT: CPT | Performed by: OPHTHALMOLOGY

## 2021-06-21 PROCEDURE — U0005 INFEC AGEN DETEC AMPLI PROBE: HCPCS | Mod: 90 | Performed by: PATHOLOGY

## 2021-06-21 ASSESSMENT — VISUAL ACUITY
OS_CC: 20/20
OD_PH_SC: 20/25
OD_SC: 20/100
CORRECTION_TYPE: GLASSES
OD_SC: J1+
METHOD: SNELLEN - LINEAR

## 2021-06-21 ASSESSMENT — REFRACTION_WEARINGRX
OD_SPHERE: -10.25
OS_ADD: +2.75
OD_ADD: +2.75
OD_AXIS: 098
OD_VBASE: UP
OS_VBASE: DOWN
OS_CYLINDER: +4.00
OS_VPRISM: 1 DOWN
OS_AXIS: 009
OS_SPHERE: -8.25
OD_VPRISM: 1
OD_CYLINDER: +3.00

## 2021-06-21 ASSESSMENT — CONF VISUAL FIELD
METHOD: COUNTING FINGERS
OD_NORMAL: 1
OS_NORMAL: 1

## 2021-06-21 ASSESSMENT — TONOMETRY
IOP_METHOD: ICARE
OD_IOP_MMHG: 14
OS_IOP_MMHG: 12

## 2021-06-21 ASSESSMENT — SLIT LAMP EXAM - LIDS
COMMENTS: NORMAL
COMMENTS: NORMAL

## 2021-06-21 ASSESSMENT — EXTERNAL EXAM - RIGHT EYE: OD_EXAM: NORMAL

## 2021-06-21 ASSESSMENT — CUP TO DISC RATIO
OD_RATIO: 0.2
OS_RATIO: 0.2

## 2021-06-21 ASSESSMENT — EXTERNAL EXAM - LEFT EYE: OS_EXAM: NORMAL

## 2021-06-21 NOTE — NURSING NOTE
"Chief Complaints and History of Present Illnesses   Patient presents with     Flashes Right Eye     Chief Complaint(s) and History of Present Illness(es)     Flashes Right Eye     Laterality: right eye    Quality: flickering    Characteristics: intermittent    Associated symptoms: Negative for shade    Context: recent eye surgery    Pain scale: 0/10              Comments     Pt complains of flashes of light in peripheral vision RE.  Complains of RE feeling irritated - \"feels like there is something in it\"  Hx of floaters- denies any new floaters since surgery.  Denies any shade/curtain or pain.  Ocular meds: Prednisolone QID RE & Ofloxacin QID RE    Leesa Reyes OT 8:07 AM June 21, 2021                   "

## 2021-06-21 NOTE — PROGRESS NOTES
POD#3, status post cataract surgery, RIGHT eye, toric IOL, myopic target    Deana is here for urgent add-on visit because she noticed flashing and flickering lights in her vision over the weekend. She hit her head on her car door while leaving the surgery center, and was concerned she may have shifted the lens. She talked with Gabriel in triage and myself with reassurance. Then, over the weekend, she talked with Dr. Ling on Saturday and Sunday and myself on Sunday regarding positive dysphotopsias causing her extreme anxiety. No pain, redness, discharge. Her vision is doing very well.     Impression/Plan:  Pseudophakia, OD: POD3, excellent post-operative appearance with good near acuity as targeted. IOP reasonable. 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 Offered reassurance that I anticipate this well continue to improve. She expressed understanding.     Doing well. Continue post-op drops as directed.    Eye protection at all times and eye shield at night for 1 week.    Limited activities with no exercise or heavy lifting for 1 week.    Instructed patient to contact us for decreasing vision, eye pain, new floaters or flashes of light or other concerning symptoms.    Written instructions given    She is scheduled for left eye surgery this Friday. She will call me on Wednesday to let me know if her symptoms have improved enough that she would like to proceed. If not, we will cancel and reschedule after she feels comfortable with her right eye vision and healing.     Teaching statement:  Complete documentation of historical and exam elements from today's encounter can be found in the full encounter summary report (not reduplicated in this progress note). I personally obtained the chief complaint(s) and history of present illness.  I confirmed and edited as necessary the review of systems, past medical/surgical history, family history, social history, and examination findings as documented by others; and I  examined the patient myself. I personally reviewed the relevant tests, images, and reports as documented above.     I formulated and edited as necessary the assessment and plan and discussed the findings and management plan with the patient and family.    Анна Mishra MD  Comprehensive Ophthalmology & Ocular Pathology  Department of Ophthalmology and Visual Neurosciences  [email or web address]  Pager [phone]

## 2021-06-22 DIAGNOSIS — H25.12 AGE-RELATED NUCLEAR CATARACT, LEFT: Primary | ICD-10-CM

## 2021-06-22 RX ORDER — OFLOXACIN 3 MG/ML
SOLUTION/ DROPS OPHTHALMIC
Qty: 5 ML | Refills: 0 | Status: SHIPPED | OUTPATIENT
Start: 2021-06-22 | End: 2021-07-28

## 2021-06-22 RX ORDER — PREDNISOLONE ACETATE 10 MG/ML
SUSPENSION/ DROPS OPHTHALMIC
Qty: 5 ML | Refills: 1 | Status: SHIPPED | OUTPATIENT
Start: 2021-06-22 | End: 2021-07-28

## 2021-06-25 ENCOUNTER — OFFICE VISIT (OUTPATIENT)
Dept: OPHTHALMOLOGY | Facility: CLINIC | Age: 56
End: 2021-06-25
Payer: COMMERCIAL

## 2021-06-25 ENCOUNTER — HOSPITAL ENCOUNTER (OUTPATIENT)
Facility: AMBULATORY SURGERY CENTER | Age: 56
Discharge: HOME | End: 2021-06-25
Attending: OPHTHALMOLOGY | Admitting: OPHTHALMOLOGY
Payer: COMMERCIAL

## 2021-06-25 VITALS
RESPIRATION RATE: 16 BRPM | OXYGEN SATURATION: 98 % | BODY MASS INDEX: 34.55 KG/M2 | HEART RATE: 54 BPM | HEIGHT: 66 IN | SYSTOLIC BLOOD PRESSURE: 144 MMHG | DIASTOLIC BLOOD PRESSURE: 88 MMHG | WEIGHT: 215 LBS | TEMPERATURE: 98.2 F

## 2021-06-25 DIAGNOSIS — H25.13 NUCLEAR SCLEROTIC CATARACT OF BOTH EYES: ICD-10-CM

## 2021-06-25 DIAGNOSIS — H25.12 AGE-RELATED NUCLEAR CATARACT, LEFT: Primary | ICD-10-CM

## 2021-06-25 DIAGNOSIS — Z98.890 POSTSURGICAL STATE, EYE: Primary | ICD-10-CM

## 2021-06-25 DIAGNOSIS — H52.223 REGULAR ASTIGMATISM OF BOTH EYES: ICD-10-CM

## 2021-06-25 PROCEDURE — V2599 CONTACT LENS/ES OTHER TYPE: HCPCS | Mod: DBP,LT

## 2021-06-25 PROCEDURE — 99024 POSTOP FOLLOW-UP VISIT: CPT | Mod: GC | Performed by: OPHTHALMOLOGY

## 2021-06-25 PROCEDURE — 66984 XCAPSL CTRC RMVL W/O ECP: CPT | Mod: LT

## 2021-06-25 RX ORDER — OFLOXACIN 3 MG/ML
1 SOLUTION/ DROPS OPHTHALMIC
Status: COMPLETED | OUTPATIENT
Start: 2021-06-25 | End: 2021-06-25

## 2021-06-25 RX ORDER — TETRACAINE HYDROCHLORIDE 5 MG/ML
SOLUTION OPHTHALMIC PRN
Status: DISCONTINUED | OUTPATIENT
Start: 2021-06-25 | End: 2021-06-25 | Stop reason: HOSPADM

## 2021-06-25 RX ORDER — BALANCED SALT SOLUTION 6.4; .75; .48; .3; 3.9; 1.7 MG/ML; MG/ML; MG/ML; MG/ML; MG/ML; MG/ML
SOLUTION OPHTHALMIC PRN
Status: DISCONTINUED | OUTPATIENT
Start: 2021-06-25 | End: 2021-06-25 | Stop reason: HOSPADM

## 2021-06-25 RX ORDER — MOXIFLOXACIN IN NACL,ISO-OS/PF 0.3MG/0.3
SYRINGE (ML) INTRAOCULAR PRN
Status: DISCONTINUED | OUTPATIENT
Start: 2021-06-25 | End: 2021-06-25 | Stop reason: HOSPADM

## 2021-06-25 RX ORDER — DICLOFENAC SODIUM 1 MG/ML
1 SOLUTION/ DROPS OPHTHALMIC
Status: COMPLETED | OUTPATIENT
Start: 2021-06-25 | End: 2021-06-25

## 2021-06-25 RX ORDER — CYCLOPENTOLAT/TROPIC/PHENYLEPH 1%-1%-2.5%
1 DROPS (EA) OPHTHALMIC (EYE)
Status: COMPLETED | OUTPATIENT
Start: 2021-06-25 | End: 2021-06-25

## 2021-06-25 RX ORDER — PROPARACAINE HYDROCHLORIDE 5 MG/ML
1 SOLUTION/ DROPS OPHTHALMIC ONCE
Status: COMPLETED | OUTPATIENT
Start: 2021-06-25 | End: 2021-06-25

## 2021-06-25 RX ORDER — LIDOCAINE HYDROCHLORIDE 10 MG/ML
INJECTION, SOLUTION EPIDURAL; INFILTRATION; INTRACAUDAL; PERINEURAL PRN
Status: DISCONTINUED | OUTPATIENT
Start: 2021-06-25 | End: 2021-06-25 | Stop reason: HOSPADM

## 2021-06-25 RX ADMIN — PROPARACAINE HYDROCHLORIDE 1 DROP: 5 SOLUTION/ DROPS OPHTHALMIC at 07:13

## 2021-06-25 RX ADMIN — DICLOFENAC SODIUM 1 DROP: 1 SOLUTION/ DROPS OPHTHALMIC at 07:13

## 2021-06-25 RX ADMIN — DICLOFENAC SODIUM 1 DROP: 1 SOLUTION/ DROPS OPHTHALMIC at 07:17

## 2021-06-25 RX ADMIN — Medication 1 DROP: at 07:17

## 2021-06-25 RX ADMIN — Medication 1 DROP: at 07:13

## 2021-06-25 RX ADMIN — OFLOXACIN 1 DROP: 3 SOLUTION/ DROPS OPHTHALMIC at 07:13

## 2021-06-25 RX ADMIN — OFLOXACIN 1 DROP: 3 SOLUTION/ DROPS OPHTHALMIC at 07:17

## 2021-06-25 RX ADMIN — DICLOFENAC SODIUM 1 DROP: 1 SOLUTION/ DROPS OPHTHALMIC at 07:23

## 2021-06-25 RX ADMIN — Medication 1 DROP: at 07:24

## 2021-06-25 RX ADMIN — OFLOXACIN 1 DROP: 3 SOLUTION/ DROPS OPHTHALMIC at 07:23

## 2021-06-25 ASSESSMENT — VISUAL ACUITY: OS_SC: 20/40

## 2021-06-25 ASSESSMENT — TONOMETRY
OS_IOP_MMHG: 18
IOP_METHOD: TONOPEN

## 2021-06-25 ASSESSMENT — SLIT LAMP EXAM - LIDS: COMMENTS: NORMAL

## 2021-06-25 ASSESSMENT — MIFFLIN-ST. JEOR: SCORE: 1576.98

## 2021-06-25 NOTE — PROGRESS NOTES
POD#0, status post cataract surgery, left eye    No complaints.  Denies eye pain.    Va sc 20/40    IOP 18 mm Hg       Impression/Plan:  Pseudophakia, left eye: POD0, good post-operative appearance. IOP reasonable.      Eye protection at all times and eye shield at night for 1 week.    Limited activities with no exercise or heavy lifting for 1 week.    Instructed patient to contact us for decreasing vision, eye pain, new floaters or flashes of light or other concerning symptoms.    Written instructions given    Return to clinic as scheduled.    Roopa Echols MD  Ophthalmology PGY-3    Teaching statement:  Complete documentation of historical and exam elements from today's encounter can be found in the full encounter summary report (not reduplicated in this progress note). I personally obtained the chief complaint(s) and history of present illness.  I confirmed and edited as necessary the review of systems, past medical/surgical history, family history, social history, and examination findings as documented by others; and I examined the patient myself. I personally reviewed the relevant tests, images, and reports as documented above.     I formulated and edited as necessary the assessment and plan and discussed the findings and management plan with the patient and family.    Анна Mishra MD  Comprehensive Ophthalmology & Ocular Pathology  Department of Ophthalmology and Visual Neurosciences  [email or web address]  Pager [phone]

## 2021-06-25 NOTE — DISCHARGE INSTRUCTIONS
[facility]  Home Care Following Your Procedure  Call a doctor if you have signs of infection (fever, growing tenderness at the surgery site, a large amount of drainage or bleeding, severe pain, foul-smelling drainage, redness, swelling).         Tylenol/Acetaminophen Consumption  To help encourage the safe use of acetaminophen, the makers of TYLENOL  have lowered the maximum daily dose for single-ingredient Extra Strength TYLENOL  (acetaminophen) products sold in the [place] from 8 pills per day (4,000 mg) to 6 pills per day (3,000 mg). The dosing interval has also changed from 2 pills every 4-6 hours to 2 pills every 6 hours.    If you feel your pain relief is insufficient, you may take Tylenol/Acetaminophen in addition to your narcotic pain medication.     Be careful not to exceed 3,000 mg of Tylenol/Acetaminophen in a 24 hour period from all sources.    If you are taking extra strength Tylenol/acetaminophen (500 mg), the maximum dose is 6 tablets in 24 hours.    If you are taking regular strength acetaminophen (325 mg), the maximum dose is 9 tablets in 24 hours.  Your doctor is:  Dr. Анна Mishra, Ophthalmology: [phone]                                    Or dial [phone] and ask for the resident on call for:  Ophthalmology  For emergency care, call the:  [place]:  [phone] (TTY for hearing impaired: [phone])

## 2021-06-25 NOTE — PROCEDURES
Ophthalmology Post-op Procedure Note    Surgical Service:    Ophthalmology & Visual Sciences  Date Performed:      June 25, 2021  Location: [facility]      Pre-operative Diagnosis: Visually significant cataract, left eye  Post-operative Diagnosis:  Pseudophakia, left eye  Operative Procedure:  Phacoemulsification with intraocular lens implantation, left eye    Surgeon(s):  Fellow/Staff Surgeon:       Анна Mishra MD  Resident Surgeon:            Elicia Echols MD    Anesthesia:   Topical/Local  Findings:  No unusual findings   Blood Loss:    Minimal  Implants:  SN6AT7 17.0 intraocular lens  Specimens:  None    Complications:  The patient did not experience any complications.   Condition: Stable    Operative Report Completion:    Description of Operation/Procedure:  After appropriate informed consent was obtained, the 6:00 position was marked with surgical marking pen in the pre-operative area. Tthe patient was brought to the operating room. The appropriate cardiac and blood pressure monitors were placed. A final pause occurred just before the start of the procedure during which the entire procedure team actively confirmed the correct patient, procedure, site, special equipment and special requirements.The patient was prepped and draped in the usual sterile fashion using 5% povidone/iodine.     An eyelid speculum was placed to open the eyelids. A paracentesis port was placed approximately sixty degrees to the left of the planned temporal incision location using the sideport blade. Approximately 0.8 cc of 1% nonpreserved lidocaine was placed into the anterior chamber. The anterior chamber was filled with dispersive viscoelastic. A clear corneal temporal incision was made with a metal 2.6 mm keratome. A round continuous tear capsulorhexis was initiated with a cystotome and completed with the Utrata forceps. Balanced salt solution on a cannula was used to perform hydrodissection. The nucleus was removed using  phacoemulsification with a chop technique. The remaining cortical material was removed using the irrigation/aspiration handpiece. The capsular bag was filled with cohesive viscoelastic. The 002 degree axis was marked on the cornea with the toric marker. A lens of the model and power listed above was placed into the capsular bag using the cartridge injection system and the toric lens axis aligned with the corneal markings.  The remaining viscoelastic was removed using the irrigation aspiration handpiece. The paracentesis and temporal wounds were hydrated with balanced salt solution. Intracameral moxifloxacin was administered. At the conclusion of the case, the wounds were felt to be watertight, the pupil was round, the lens was centered, and the anterior chamber was deep. A few drops of antibiotic and prednisolone were given to the operative eye. The eyelid speculum was removed. A shield was placed over the operative eye.      Attending Attestation:  I was present for and performed the entire procedure.  Анна Mishra MD

## 2021-06-29 ENCOUNTER — TELEPHONE (OUTPATIENT)
Dept: OPHTHALMOLOGY | Facility: CLINIC | Age: 56
End: 2021-06-29

## 2021-07-05 ENCOUNTER — OFFICE VISIT (OUTPATIENT)
Dept: OPHTHALMOLOGY | Facility: CLINIC | Age: 56
End: 2021-07-05
Attending: OPHTHALMOLOGY
Payer: COMMERCIAL

## 2021-07-05 DIAGNOSIS — H43.811 POSTERIOR VITREOUS DETACHMENT, RIGHT: ICD-10-CM

## 2021-07-05 DIAGNOSIS — H52.13 MYOPIC ASTIGMATISM OF BOTH EYES: ICD-10-CM

## 2021-07-05 DIAGNOSIS — Z96.1 PSEUDOPHAKIA, BOTH EYES: Primary | ICD-10-CM

## 2021-07-05 DIAGNOSIS — H52.203 MYOPIC ASTIGMATISM OF BOTH EYES: ICD-10-CM

## 2021-07-05 DIAGNOSIS — H52.4 PRESBYOPIA: ICD-10-CM

## 2021-07-05 PROCEDURE — 99024 POSTOP FOLLOW-UP VISIT: CPT | Performed by: OPHTHALMOLOGY

## 2021-07-05 PROCEDURE — G0463 HOSPITAL OUTPT CLINIC VISIT: HCPCS

## 2021-07-05 ASSESSMENT — CUP TO DISC RATIO
OD_RATIO: 0.2
OS_RATIO: 0.2

## 2021-07-05 ASSESSMENT — TONOMETRY
OD_IOP_MMHG: 13
OS_IOP_MMHG: 11
IOP_METHOD: ICARE

## 2021-07-05 ASSESSMENT — REFRACTION_MANIFEST
OD_ADD: +2.50
OD_CYLINDER: +0.75
OD_AXIS: 030
OS_SPHERE: -3.25
OS_AXIS: 060
OD_SPHERE: -3.00
OS_ADD: +2.50
OS_CYLINDER: +0.50

## 2021-07-05 ASSESSMENT — VISUAL ACUITY
OS_SC: 20/300
METHOD: SNELLEN - LINEAR
METHOD_MR_RETINOSCOPY: 1
OD_SC: 20/200

## 2021-07-05 ASSESSMENT — EXTERNAL EXAM - LEFT EYE: OS_EXAM: NORMAL

## 2021-07-05 ASSESSMENT — EXTERNAL EXAM - RIGHT EYE: OD_EXAM: NORMAL

## 2021-07-05 ASSESSMENT — SLIT LAMP EXAM - LIDS
COMMENTS: NORMAL
COMMENTS: NORMAL

## 2021-07-05 NOTE — NURSING NOTE
Chief Complaints and History of Present Illnesses   Patient presents with     Post Op (Ophthalmology) Left Eye     Chief Complaint(s) and History of Present Illness(es)     Post Op (Ophthalmology) Left Eye     Laterality: both eyes    Course: stable    Associated symptoms: floaters and flashes.  Negative for eye pain and tearing    Treatments tried: eye drops    Pain scale: 0/10              Comments     Prednisolone BID RE,  TID LE  Floaters and flashing RE>LE less than last visit     Albertina Mills COT 9:16 AM July 5, 2021

## 2021-07-05 NOTE — PROGRESS NOTES
"HPI:  Deana Garcia is a 55 year old female here for follow-up after CE/IOL both eyes. She is continuing to note positive dysphotopsias like a \"disco ball\" right eye. The left eye has had a few lights, but overall has been better. She is also noting that her floaters are more prominent. No eye pain, redness, discharge. No flashes.    POH: Myopia, choroidal nevus right eye. No history of eye surgery or trauma  PMH: Asthma, No DM    Assessment & Plan   (Z96.1) Pseudophakia, both eyes  (primary encounter diagnosis)  Comment: Good post-operative appearance with toric lenses well-positioned. Residual myopia as targeted.  Plan: Anticipate that positive dysphotopsia will improve with time. Complete drop taper.    (H43.811) Posterior vitreous detachment, right  Comment: Forrest ring right eye. More prominent subjectively since CE/IOL  Plan: Monitor for now. If persistently bothered over time, can consider referral for YAG vitreolysis.     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     -----------------------------------------------------------------------------------    Patient disposition:   Return in about 1 year (around 7/5/2022) for dilated eye exam, or sooner as needed.    Teaching statement:  Complete documentation of historical and exam elements from today's encounter can be found in the full encounter summary report (not reduplicated in this progress note). I personally obtained the chief complaint(s) and history of present illness.  I confirmed and edited as necessary the review of systems, past medical/surgical history, family history, social history, and examination findings as documented by others; and I examined the patient myself. I personally reviewed the relevant tests, images, and reports as documented above.     I " formulated and edited as necessary the assessment and plan and discussed the findings and management plan with the patient and family.      Анна Mishra MD  Comprehensive Ophthalmology & Ocular Pathology  Department of Ophthalmology and Visual Neurosciences  [email or web address]  Pager [phone]

## 2021-07-22 ENCOUNTER — TELEPHONE (OUTPATIENT)
Dept: OPHTHALMOLOGY | Facility: CLINIC | Age: 56
End: 2021-07-22

## 2021-07-22 NOTE — TELEPHONE ENCOUNTER
Pt calling triage line and message received by triage from surgery scheduler    Pt has questions if appt with Dr. Mishra more appropriate than tech only appt today    Spoke to pt at 1344    New glasses received one week ago    Feels something not right with reading vision.    Pt able to read without glasses  And bifocal Rx spherical equivalence has similarities.    Pt states vision seems different when changes from glasses to reading without glasses.  Right eye reading vision better than left eye reading vision without glasses use.    Pt can have some bobbling of objects when standing/moving with new glasses on.    Distance vision good and computer vision good    Pt has had prism in glasses in past  No doubling of vision per pt    Pt vision concerns times one week.and has not worsened per pt    Pt would like to know if has to do with recent cataract surgery    Pt now scheduled with Dr. Mishra next Wednesday     Reviewed ok to cancel tech visit-- pt likely would hold on updating new glasses Rx until see/confirms with Dr. Mishra and able to recheck glasses at that visit.    Pt may review with Dr. Mishra at visit if feels would benefit with working with Dr. Murrieta with glasses also in future.    Asked pt to measure comfortable reading distance and update technician at visit.    Reviewed to monitor for any worsening of vision/symptoms and call clinic between visits if occur-- reviewed [phone] option 1 for information to page on call eye provider if would occur after hours/weekend.    Reviewed would forward to Dr. Mishra for review and contact pt if would like to amend plan    Gabriel Mendoza RN 2:08 PM 07/22/21

## 2021-07-28 ENCOUNTER — OFFICE VISIT (OUTPATIENT)
Dept: OPHTHALMOLOGY | Facility: CLINIC | Age: 56
End: 2021-07-28
Attending: OPHTHALMOLOGY
Payer: COMMERCIAL

## 2021-07-28 DIAGNOSIS — H25.12 AGE-RELATED NUCLEAR CATARACT, LEFT: ICD-10-CM

## 2021-07-28 DIAGNOSIS — H35.352 CYSTOID MACULAR EDEMA, LEFT: Primary | ICD-10-CM

## 2021-07-28 PROCEDURE — 92134 CPTRZ OPH DX IMG PST SGM RTA: CPT | Performed by: OPHTHALMOLOGY

## 2021-07-28 PROCEDURE — 99024 POSTOP FOLLOW-UP VISIT: CPT | Performed by: OPHTHALMOLOGY

## 2021-07-28 PROCEDURE — G0463 HOSPITAL OUTPT CLINIC VISIT: HCPCS

## 2021-07-28 RX ORDER — PREDNISOLONE ACETATE 10 MG/ML
1 SUSPENSION/ DROPS OPHTHALMIC 4 TIMES DAILY
Qty: 5 ML | Refills: 1 | Status: SHIPPED | OUTPATIENT
Start: 2021-07-28 | End: 2021-12-14

## 2021-07-28 RX ORDER — KETOROLAC TROMETHAMINE 5 MG/ML
1 SOLUTION OPHTHALMIC 4 TIMES DAILY
Qty: 5 ML | Refills: 1 | Status: SHIPPED | OUTPATIENT
Start: 2021-07-28 | End: 2021-12-14

## 2021-07-28 ASSESSMENT — CONF VISUAL FIELD
OD_NORMAL: 1
METHOD: COUNTING FINGERS
OS_NORMAL: 1

## 2021-07-28 ASSESSMENT — REFRACTION_WEARINGRX
OD_SPHERE: -3.00
OS_SPHERE: -3.25
OS_AXIS: 052
OS_ADD: +2.50
OS_CYLINDER: +0.75
OD_ADD: +2.50
OD_AXIS: 038
OD_CYLINDER: +1.00
SPECS_TYPE: PAL

## 2021-07-28 ASSESSMENT — SLIT LAMP EXAM - LIDS
COMMENTS: NORMAL
COMMENTS: NORMAL

## 2021-07-28 ASSESSMENT — VISUAL ACUITY
OS_CC: 20/25
OS_CC+: -2
OD_CC: 20/20
METHOD_MR_RETINOSCOPY: 1
METHOD: SNELLEN - LINEAR

## 2021-07-28 ASSESSMENT — REFRACTION_MANIFEST
OS_CYLINDER: +0.75
OS_AXIS: 060
OS_SPHERE: -3.25

## 2021-07-28 ASSESSMENT — TONOMETRY
OS_IOP_MMHG: 12
OD_IOP_MMHG: 13
IOP_METHOD: TONOPEN

## 2021-07-28 ASSESSMENT — EXTERNAL EXAM - RIGHT EYE: OD_EXAM: NORMAL

## 2021-07-28 ASSESSMENT — CUP TO DISC RATIO
OS_RATIO: 0.2
OD_RATIO: 0.2

## 2021-07-28 ASSESSMENT — EXTERNAL EXAM - LEFT EYE: OS_EXAM: NORMAL

## 2021-07-28 NOTE — NURSING NOTE
Chief Complaints and History of Present Illnesses   Patient presents with     Follow Up     Chief Complaint(s) and History of Present Illness(es)     Follow Up     Laterality: both eyes    Associated symptoms: floaters.  Negative for flashes, eye pain, redness and tearing    Treatments tried: no treatments    Pain scale: 0/10              Comments     Pt states left eye distortion and flashing in the left eye   Notices floaters more in the left eye since surgery, states things are not as bright in the left eye    Albertina Mills COT 11:05 AM July 28, 2021

## 2021-07-28 NOTE — PROGRESS NOTES
HPI:  Deana Garcia is a 55 year old female here for continued follow-up after CE/IOL both eyes. Her left eye peripheral vision seems distorted at times with peripheral lights. She also notes that the left eye vision isn't as crisp and seems a little more dim. No eye pain, redness, discharge. She notes persistent floaters (no change)    POH: Myopia, choroidal nevus right eye. No history of eye surgery or trauma  PMH: Asthma, No DM    Assessment & Plan   (Z96.1) Pseudophakia, both eyes  (primary encounter diagnosis)  (H35.352) Cystoid macular edema, left  (primary encounter diagnosis)  Comment: Good post-operative appearance with toric lenses well-positioned. Residual myopia as targeted. Irregular FLR on exam with small CME/Taylor Jennifer on mac OCT today.   Plan: Anticipate that peripheral dysphotopsia will improve with time.  Re-start prednisolone 4x daily left eye, start ketorolac 4x daily right eye  Recheck in 4 weeks with dilation and mac OCT OS    (H43.811) Posterior vitreous detachment, right  Comment: Forrest ring right eye. More prominent subjectively since CE/IOL  Plan: Monitor for now. If persistently bothered over time, can consider referral for YAG vitreolysis.     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    Teaching statement:  Complete documentation of historical and exam elements from today's encounter can be found in the full encounter summary report (not reduplicated in this progress note). I personally obtained the chief complaint(s) and history of present illness.  I confirmed and edited as necessary the  review of systems, past medical/surgical history, family history, social history, and examination findings as documented by others; and I examined the patient myself. I personally reviewed the relevant tests, images, and reports as documented above.     I formulated and edited as necessary the assessment and plan and discussed the findings and management plan with the patient and family.      Анна Mishra MD  Comprehensive Ophthalmology & Ocular Pathology  Department of Ophthalmology and Visual Neurosciences  [email or web address]  Pager [phone]

## 2021-08-25 ENCOUNTER — OFFICE VISIT (OUTPATIENT)
Dept: OPHTHALMOLOGY | Facility: CLINIC | Age: 56
End: 2021-08-25
Attending: OPHTHALMOLOGY
Payer: COMMERCIAL

## 2021-08-25 DIAGNOSIS — H35.352 CYSTOID MACULAR EDEMA, LEFT: Primary | ICD-10-CM

## 2021-08-25 DIAGNOSIS — Z96.1 PSEUDOPHAKIA, BOTH EYES: ICD-10-CM

## 2021-08-25 PROCEDURE — 92134 CPTRZ OPH DX IMG PST SGM RTA: CPT | Performed by: OPHTHALMOLOGY

## 2021-08-25 PROCEDURE — G0463 HOSPITAL OUTPT CLINIC VISIT: HCPCS

## 2021-08-25 PROCEDURE — 99024 POSTOP FOLLOW-UP VISIT: CPT | Performed by: OPHTHALMOLOGY

## 2021-08-25 RX ORDER — ALPRAZOLAM 0.25 MG
TABLET ORAL
COMMUNITY
Start: 2021-06-08

## 2021-08-25 ASSESSMENT — VISUAL ACUITY
CORRECTION_TYPE: GLASSES
OS_CC: 20/20
OD_CC: 20/20
METHOD: SNELLEN - LINEAR
OS_CC+: -1

## 2021-08-25 ASSESSMENT — EXTERNAL EXAM - RIGHT EYE: OD_EXAM: NORMAL

## 2021-08-25 ASSESSMENT — CONF VISUAL FIELD
OD_NORMAL: 1
OS_NORMAL: 1
METHOD: COUNTING FINGERS

## 2021-08-25 ASSESSMENT — TONOMETRY
OD_IOP_MMHG: 17
OS_IOP_MMHG: 18
IOP_METHOD: TONOPEN

## 2021-08-25 ASSESSMENT — SLIT LAMP EXAM - LIDS
COMMENTS: NORMAL
COMMENTS: NORMAL

## 2021-08-25 ASSESSMENT — EXTERNAL EXAM - LEFT EYE: OS_EXAM: NORMAL

## 2021-08-25 ASSESSMENT — CUP TO DISC RATIO
OD_RATIO: 0.2
OS_RATIO: 0.2

## 2021-08-25 NOTE — PROGRESS NOTES
HPI:  Deana Garcia is a 55 year old female here for continued follow-up of post-operative CME left eye. She still notes some positive dysphotopsias in both eyes, but notes that the spots in her left eye have improved. The vision still isn't quite as crisp as her right eye, but getting better. No eye pain, redness, discharge. She notes persistent floaters (no change)    POH: Myopia, choroidal nevus right eye. No history of eye surgery or trauma  PMH: Asthma, No DM    Assessment & Plan   (Z96.1) Pseudophakia, both eyes  (primary encounter diagnosis)  (H35.352) Cystoid macular edema, left  (primary encounter diagnosis)  Comment: Good post-operative appearance with toric lenses well-positioned. Residual myopia as targeted. Small CME/Lyman Jennifer now resolved left eye.   Plan: Anticipate that peripheral dysphotopsia will improve with time.  She ran out of prednisolone yesterday, so ok to discontinue  Use ketorolac 4x daily left eye until the bottle runs out, then stop    (H43.811) Posterior vitreous detachment, right  Comment: Forrest ring right eye. More prominent subjectively after CE/IOL, but now less noticeable than last visit.  Plan: Monitor for now. If persistently bothered over time, can consider referral for YAG vitreolysis.     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    Teaching statement:  Complete documentation of historical and exam elements from today's encounter can be found in the full encounter summary report (not reduplicated in this progress note). I personally obtained the chief complaint(s) and history of  present illness.  I confirmed and edited as necessary the review of systems, past medical/surgical history, family history, social history, and examination findings as documented by others; and I examined the patient myself. I personally reviewed the relevant tests, images, and reports as documented above.     I formulated and edited as necessary the assessment and plan and discussed the findings and management plan with the patient and family.      Анна Mishra MD  Comprehensive Ophthalmology & Ocular Pathology  Department of Ophthalmology and Visual Neurosciences  [email or web address]  Pager [phone]

## 2021-08-25 NOTE — NURSING NOTE
Chief Complaints and History of Present Illnesses   Patient presents with     Follow Up     Cystoid macular edema, left     Chief Complaint(s) and History of Present Illness(es)     Follow Up     Laterality: left eye    Course: gradually improving    Associated symptoms: tearing, redness (long standing), headache and eye pain (very mild, left eye, short lasting)    Pain scale: 1/10    Comments: Cystoid macular edema, left              Comments     She states that her vision seems a bit improved since her last exam.  Her left eye is also feeling a bit more comfortable.      She is using prednisolone 4x daily left eye and ketorolac 4x daily left eye.    Esther Goncalves, COT 1:04 PM  August 25, 2021

## 2021-09-19 ENCOUNTER — HEALTH MAINTENANCE LETTER (OUTPATIENT)
Age: 56
End: 2021-09-19

## 2021-10-13 ENCOUNTER — ANCILLARY PROCEDURE (OUTPATIENT)
Dept: MAMMOGRAPHY | Facility: CLINIC | Age: 56
End: 2021-10-13
Attending: OBSTETRICS & GYNECOLOGY
Payer: COMMERCIAL

## 2021-10-13 DIAGNOSIS — Z12.31 VISIT FOR SCREENING MAMMOGRAM: ICD-10-CM

## 2021-10-13 DIAGNOSIS — Z12.31 ENCOUNTER FOR SCREENING MAMMOGRAM FOR MALIGNANT NEOPLASM OF BREAST: ICD-10-CM

## 2021-10-13 PROCEDURE — 77067 SCR MAMMO BI INCL CAD: CPT | Mod: GC | Performed by: RADIOLOGY

## 2021-10-13 PROCEDURE — 77063 BREAST TOMOSYNTHESIS BI: CPT | Mod: GC | Performed by: RADIOLOGY

## 2021-10-18 ENCOUNTER — TELEPHONE (OUTPATIENT)
Dept: OPHTHALMOLOGY | Facility: CLINIC | Age: 56
End: 2021-10-18

## 2021-10-18 NOTE — TELEPHONE ENCOUNTER
I returned the patient's call and she describes left eye blurred vision and discomfort.  She notes artificial tears are now helpful.  She is now using Refresh twice daily.  I suggested an eye ointment but she wants to wait to see doctor.  I offered her an appointment in the acute clinic but she wants to see her provider.  Note sent to general ophthalmology team.

## 2021-10-18 NOTE — TELEPHONE ENCOUNTER
Called pt - she has appt w/Danica in November.  Recommend increase PFAT's (she has been using preserved tears) to QID each eye and using AT ointment at bedtime.      She will call or my chart message us with any other questions.    Lulu Ellison, STAR 4:12 PM 10/18/2021

## 2021-10-18 NOTE — TELEPHONE ENCOUNTER
[facility] Call Center    Phone Message    May a detailed message be left on voicemail: yes     Reason for Call: Other:   Pt calling to report that she is still having severe dryness in her left eye. Pt would like a call back to discuss.     Action Taken: Other:  eye     Travel Screening: Not Applicable

## 2021-11-03 ENCOUNTER — OFFICE VISIT (OUTPATIENT)
Dept: OPHTHALMOLOGY | Facility: CLINIC | Age: 56
End: 2021-11-03
Attending: OPHTHALMOLOGY
Payer: COMMERCIAL

## 2021-11-03 DIAGNOSIS — Z96.1 PSEUDOPHAKIA, BOTH EYES: Primary | ICD-10-CM

## 2021-11-03 DIAGNOSIS — H11.823 CONJUNCTIVOCHALASIS OF BOTH EYES: ICD-10-CM

## 2021-11-03 DIAGNOSIS — H04.123 DRY EYES, BILATERAL: ICD-10-CM

## 2021-11-03 PROCEDURE — 99213 OFFICE O/P EST LOW 20 MIN: CPT | Mod: GC | Performed by: OPHTHALMOLOGY

## 2021-11-03 PROCEDURE — G0463 HOSPITAL OUTPT CLINIC VISIT: HCPCS

## 2021-11-03 RX ORDER — HYDROCHLOROTHIAZIDE 12.5 MG/1
TABLET ORAL
COMMUNITY
Start: 2021-10-12

## 2021-11-03 ASSESSMENT — REFRACTION_WEARINGRX
OD_ADD: +2.50
OD_SPHERE: -3.00
OD_CYLINDER: +0.75
OD_AXIS: 030
OS_SPHERE: -3.25
OS_CYLINDER: +0.50
SPECS_TYPE: PAL
OS_ADD: +2.50
OS_AXIS: 060

## 2021-11-03 ASSESSMENT — VISUAL ACUITY
OD_CC: 20/20
OS_CC: 20/20
CORRECTION_TYPE: GLASSES
METHOD: SNELLEN - LINEAR

## 2021-11-03 ASSESSMENT — EXTERNAL EXAM - RIGHT EYE: OD_EXAM: NORMAL

## 2021-11-03 ASSESSMENT — CONF VISUAL FIELD
OD_NORMAL: 1
OS_NORMAL: 1
METHOD: COUNTING FINGERS

## 2021-11-03 ASSESSMENT — SLIT LAMP EXAM - LIDS
COMMENTS: NORMAL
COMMENTS: NORMAL

## 2021-11-03 ASSESSMENT — TONOMETRY
OS_IOP_MMHG: 10
OD_IOP_MMHG: 12
IOP_METHOD: ICARE

## 2021-11-03 ASSESSMENT — EXTERNAL EXAM - LEFT EYE: OS_EXAM: NORMAL

## 2021-11-03 NOTE — PROGRESS NOTES
HPI:  Deana Garcia is a 55 year old female here for continued follow-up after CE/IOL both eyes. She reports irritation, pulsatile sensation in her left eye that improves with AT ointment. She also reports fb sensation in right eye. She believes her symptoms all started after she started using pred in her left eye    POH: Myopia, choroidal nevus right eye. 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 Residual myopia as targeted. BCVA 20/20 OU  Plan: Observe    (H43.811) Posterior vitreous detachment, right  Comment: Forrest ring right eye. More prominent subjectively since CE/IOL  Plan: Monitor for now. If persistently bothered over time, can consider referral for YAG vitreolysis.     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 Not dilated today  Plan: Monitor      (H52.203,  H52.13) Myopic astigmatism of both eyes  (H52.4) Presbyopia  Comment: Good vision in current glasses  Plan: Observe    -----------------------------------------------------------------------------------    Patient disposition:   Return if symptoms worsen or fail to improve.    Socorro Zaldivar MD  Ophthalmology Resident, PGY-4     Teaching statement:  Complete documentation of historical and exam elements from today's encounter can be found in the full encounter summary report (not reduplicated in this progress note). I personally obtained the chief complaint(s) and history of present illness.  I confirmed and edited as necessary the review of systems, past medical/surgical history, family history, social history, and examination  findings as documented by others; and I examined the patient myself. I personally reviewed the relevant tests, images, and reports as documented above.     I formulated and edited as necessary the assessment and plan and discussed the findings and management plan with the patient and family.      Анна Mishra MD  Comprehensive Ophthalmology & Ocular Pathology  Department of Ophthalmology and Visual Neurosciences  [email or web address]  Pager [phone]

## 2021-11-03 NOTE — NURSING NOTE
Chief Complaints and History of Present Illnesses   Patient presents with     Eye Pain Left Eye     Chief Complaint(s) and History of Present Illness(es)     Eye Pain Left Eye     Laterality: In left eye    Pain scale: 4/10    Course: stable    Associated symptoms: photophobia, foreign body sensation (both eyes), redness (mild) and itching (may be allergy related).  Negative for blurred vision and discharge    Treatments tried: Systane ointment    Response to treatment: moderate improvement              Comments     For the past several months she has had irritation in her left eye.  She was feeling a pulsing sensation in the temporal corner of her left eye.  She was also seeing something in her left temporal periphery.  For the past week she has been using Systane ointment at night.  The ointment seems to have improved all of her symptoms.  The area effected is shrinking.  Her left eye is hard to ignore but is not painful.  The mornings are best time of day concerning her eye irritation.    Esther Goncalves, COT 8:26 AM  November 3, 2021

## 2021-11-14 ENCOUNTER — HEALTH MAINTENANCE LETTER (OUTPATIENT)
Age: 56
End: 2021-11-14

## 2021-12-14 ENCOUNTER — OFFICE VISIT (OUTPATIENT)
Dept: SLEEP MEDICINE | Facility: CLINIC | Age: 56
End: 2021-12-14
Payer: COMMERCIAL

## 2021-12-14 VITALS
DIASTOLIC BLOOD PRESSURE: 83 MMHG | BODY MASS INDEX: 35.2 KG/M2 | SYSTOLIC BLOOD PRESSURE: 129 MMHG | WEIGHT: 219 LBS | HEART RATE: 67 BPM | HEIGHT: 66 IN | OXYGEN SATURATION: 96 %

## 2021-12-14 DIAGNOSIS — R29.818 SUSPECTED SLEEP APNEA: Primary | ICD-10-CM

## 2021-12-14 DIAGNOSIS — F51.04 INSOMNIA, PSYCHOPHYSIOLOGICAL: ICD-10-CM

## 2021-12-14 DIAGNOSIS — R06.83 SNORING: ICD-10-CM

## 2021-12-14 DIAGNOSIS — I10 HYPERTENSION, UNSPECIFIED TYPE: ICD-10-CM

## 2021-12-14 PROCEDURE — 99204 OFFICE O/P NEW MOD 45 MIN: CPT | Performed by: INTERNAL MEDICINE

## 2021-12-14 RX ORDER — MIRTAZAPINE 7.5 MG/1
TABLET, FILM COATED ORAL
COMMUNITY
Start: 2021-12-09

## 2021-12-14 RX ORDER — ZOLPIDEM TARTRATE 5 MG/1
TABLET ORAL
Qty: 1 TABLET | Refills: 0 | Status: SHIPPED | OUTPATIENT
Start: 2021-12-14 | End: 2022-10-12

## 2021-12-14 ASSESSMENT — ENCOUNTER SYMPTOMS
NERVOUS/ANXIOUS: 1
HOARSE VOICE: 0
NECK MASS: 0
SPEECH CHANGE: 0
TASTE DISTURBANCE: 0
PARALYSIS: 0
MEMORY LOSS: 0
EYE PAIN: 0
NAUSEA: 1
TREMORS: 0
DECREASED CONCENTRATION: 0
JAUNDICE: 0
EYE REDNESS: 0
HEARTBURN: 0
TROUBLE SWALLOWING: 0
BLOOD IN STOOL: 0
DIZZINESS: 1
RECTAL PAIN: 0
BLOATING: 0
BOWEL INCONTINENCE: 0
SINUS CONGESTION: 1
NUMBNESS: 0
SEIZURES: 0
HEADACHES: 1
PANIC: 0
EYE IRRITATION: 1
DEPRESSION: 0
SINUS PAIN: 0
ABDOMINAL PAIN: 0
SMELL DISTURBANCE: 0
DIARRHEA: 1
WEAKNESS: 0
SORE THROAT: 0
VOMITING: 0
DOUBLE VISION: 0
TINGLING: 0
CONSTIPATION: 0
LOSS OF CONSCIOUSNESS: 0
INSOMNIA: 0
EYE WATERING: 1
DISTURBANCES IN COORDINATION: 1

## 2021-12-14 ASSESSMENT — SLEEP AND FATIGUE QUESTIONNAIRES
HOW LIKELY ARE YOU TO NOD OFF OR FALL ASLEEP WHILE WATCHING TV: SLIGHT CHANCE OF DOZING
HOW LIKELY ARE YOU TO NOD OFF OR FALL ASLEEP WHILE LYING DOWN TO REST IN THE AFTERNOON WHEN CIRCUMSTANCES PERMIT: MODERATE CHANCE OF DOZING
HOW LIKELY ARE YOU TO NOD OFF OR FALL ASLEEP WHILE SITTING AND READING: MODERATE CHANCE OF DOZING
HOW LIKELY ARE YOU TO NOD OFF OR FALL ASLEEP WHILE SITTING QUIETLY AFTER LUNCH WITHOUT ALCOHOL: SLIGHT CHANCE OF DOZING
HOW LIKELY ARE YOU TO NOD OFF OR FALL ASLEEP WHEN YOU ARE A PASSENGER IN A CAR FOR AN HOUR WITHOUT A BREAK: WOULD NEVER DOZE
HOW LIKELY ARE YOU TO NOD OFF OR FALL ASLEEP IN A CAR, WHILE STOPPED FOR A FEW MINUTES IN TRAFFIC: WOULD NEVER DOZE
HOW LIKELY ARE YOU TO NOD OFF OR FALL ASLEEP WHILE SITTING AND TALKING TO SOMEONE: WOULD NEVER DOZE
HOW LIKELY ARE YOU TO NOD OFF OR FALL ASLEEP WHILE SITTING INACTIVE IN A PUBLIC PLACE: WOULD NEVER DOZE

## 2021-12-14 ASSESSMENT — MIFFLIN-ST. JEOR: SCORE: 1590.13
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
## 2021-12-14 NOTE — NURSING NOTE
"Chief Complaint   Patient presents with     Sleep Problem     Trouble staying asleep, improved with medication       Initial /83   Pulse 67   Ht 1.676 m (5' 6\")   Wt 99.3 kg (219 lb)   SpO2 96%   BMI 35.35 kg/m   Estimated body mass index is 35.35 kg/m  as calculated from the following:    Height as of this encounter: 1.676 m (5' 6\").    Weight as of this encounter: 99.3 kg (219 lb).    Medication Reconciliation: complete  ESS 6  Neck circumference: 39 centimeters.  Becky Holt, SOMMER  "

## 2021-12-14 NOTE — PATIENT INSTRUCTIONS
Your BMI is Body mass index is 35.35 kg/m .  Weight management is a personal decision.  If you are interested in exploring weight loss strategies, the following discussion covers the approaches that may be successful. Body mass index (BMI) is one way to tell whether you are at a healthy weight, overweight, or obese. It measures your weight in relation to your height.  A BMI of 18.5 to 24.9 is in the healthy range. A person with a BMI of 25 to 29.9 is considered overweight, and someone with a BMI of 30 or greater is considered obese. More than two-thirds of American adults are considered overweight or obese.  Being overweight or obese increases the risk for further weight gain. Excess weight may lead to heart disease and diabetes.  Creating and following plans for healthy eating and physical activity may help you improve your health.  Weight control is part of healthy lifestyle and includes exercise, emotional health, and healthy eating habits. Careful eating habits lifelong are the mainstay of weight control. Though there are significant health benefits from weight loss, long-term weight loss with diet alone may be very difficult to achieve- studies show long-term success with dietary management in less than 10% of people. Attaining a healthy weight may be especially difficult to achieve in those with severe obesity. In some cases, medications, devices and surgical management might be considered.  What can you do?  If you are overweight or obese and are interested in methods for weight loss, you should discuss this with your provider.     Consider reducing daily calorie intake by 500 calories.     Keep a food journal.     Avoiding skipping meals, consider cutting portions instead.    Diet combined with exercise helps maintain muscle while optimizing fat loss. Strength training is particularly important for building and maintaining muscle mass. Exercise helps reduce stress, increase energy, and improves fitness.  Increasing exercise without diet control, however, may not burn enough calories to loose weight.       Start walking three days a week 10-20 minutes at a time    Work towards walking thirty minutes five days a week     Eventually, increase the speed of your walking for 1-2 minutes at time    In addition, we recommend that you review healthy lifestyles and methods for weight loss available through the National Institutes of Health patient information sites:  http://win.niddk.nih.gov/publications/index.htm    And look into health and wellness programs that may be available through your health insurance provider, employer, local community center, or [employer] club.    Weight management plan: Patient was referred to their PCP to discuss a diet and exercise plan.

## 2022-04-08 ENCOUNTER — THERAPY VISIT (OUTPATIENT)
Dept: SLEEP MEDICINE | Facility: CLINIC | Age: 57
End: 2022-04-08
Attending: INTERNAL MEDICINE
Payer: COMMERCIAL

## 2022-04-08 DIAGNOSIS — R06.83 SNORING: ICD-10-CM

## 2022-04-08 DIAGNOSIS — I10 HYPERTENSION, UNSPECIFIED TYPE: ICD-10-CM

## 2022-04-08 DIAGNOSIS — R29.818 SUSPECTED SLEEP APNEA: ICD-10-CM

## 2022-04-08 DIAGNOSIS — F51.04 INSOMNIA, PSYCHOPHYSIOLOGICAL: ICD-10-CM

## 2022-04-08 PROCEDURE — 95810 POLYSOM 6/> YRS 4/> PARAM: CPT | Performed by: INTERNAL MEDICINE

## 2022-05-04 ENCOUNTER — VIRTUAL VISIT (OUTPATIENT)
Dept: SLEEP MEDICINE | Facility: CLINIC | Age: 57
End: 2022-05-04
Payer: COMMERCIAL

## 2022-05-04 VITALS — WEIGHT: 220 LBS | BODY MASS INDEX: 35.36 KG/M2 | HEIGHT: 66 IN

## 2022-05-04 DIAGNOSIS — G47.33 OSA (OBSTRUCTIVE SLEEP APNEA): Primary | ICD-10-CM

## 2022-05-04 PROCEDURE — 99215 OFFICE O/P EST HI 40 MIN: CPT | Mod: 95 | Performed by: INTERNAL MEDICINE

## 2022-05-04 ASSESSMENT — SLEEP AND FATIGUE QUESTIONNAIRES
HOW LIKELY ARE YOU TO NOD OFF OR FALL ASLEEP WHEN YOU ARE A PASSENGER IN A CAR FOR AN HOUR WITHOUT A BREAK: WOULD NEVER DOZE
HOW LIKELY ARE YOU TO NOD OFF OR FALL ASLEEP WHILE LYING DOWN TO REST IN THE AFTERNOON WHEN CIRCUMSTANCES PERMIT: SLIGHT CHANCE OF DOZING
HOW LIKELY ARE YOU TO NOD OFF OR FALL ASLEEP WHILE SITTING AND TALKING TO SOMEONE: WOULD NEVER DOZE
HOW LIKELY ARE YOU TO NOD OFF OR FALL ASLEEP WHILE WATCHING TV: SLIGHT CHANCE OF DOZING
HOW LIKELY ARE YOU TO NOD OFF OR FALL ASLEEP IN A CAR, WHILE STOPPED FOR A FEW MINUTES IN TRAFFIC: WOULD NEVER DOZE
HOW LIKELY ARE YOU TO NOD OFF OR FALL ASLEEP WHILE SITTING AND READING: SLIGHT CHANCE OF DOZING
HOW LIKELY ARE YOU TO NOD OFF OR FALL ASLEEP WHILE SITTING QUIETLY AFTER LUNCH WITHOUT ALCOHOL: SLIGHT CHANCE OF DOZING
HOW LIKELY ARE YOU TO NOD OFF OR FALL ASLEEP WHILE SITTING INACTIVE IN A PUBLIC PLACE: WOULD NEVER DOZE

## 2022-05-04 NOTE — PROGRESS NOTES
Deana is a 56 year old who is being evaluated via a billable video visit.      How would you like to obtain your AVS? MyChart  If the video visit is dropped, the invitation should be resent by: Send to e-mail at: [email or web address]  Will anyone else be joining your video visit? No      Video Start Time: 4:30 PM  Video-Visit Details    Type of service:  Video Visit    Video End Time:5:04 PM    Originating Location (pt. Location): Home    Distant Location (provider location):  [facility]     Platform used for Video Visit: [employer]  Sleep Study Follow-Up Visit:    Date on this visit: 5/4/2022    Deana Garcia comes in today for follow-up of her sleep study done on 4/8/22 at the [facility] [place] for possible sleep apnea.    Sleep Architecture: The total recording time of the polysomnogram was 450.1 minutes. The total sleep time was 255.0 minutes. Sleep latency was normal at 9.7 minutes without the use of a sleep aid. REM latency was - minutes. Arousal index was increased at 30.4 arousals per hour. Sleep efficiency was decreased at 56.6%. Wake after sleep onset was 185.0 minutes. The patient spent 29.2% of total sleep time in Stage N1, 55.9% in Stage N2, 14.9% in Stage N3, and 0.0% in REM. Time in REM supine was - minutes.     Respiration: Severe obstructive sleep apnea.   Events ? The polysomnogram revealed a presence of 10 obstructive, 3 central, and - mixed apneas resulting in an apnea index of 3.1 events per hour. There were 157 obstructive hypopneas and - central hypopneas resulting in an obstructive hypopnea index of 36.9 and central hypopnea index of - events per hour. The combined apnea/hypopnea index was 40.0 events per hour (central apnea/hypopnea index was 0.7 events per hour). The REM AHI was - events per hour. The supine AHI was 62.9 events per hour. The RERA index was 1.2 events per hour. The RDI was 41.2 events per hour.   Snoring - was reported as loud.   Respiratory  rate and pattern - was notable for normal respiratory rate and pattern.   Sustained Sleep Associated Hypoventilation - Transcutaneous carbon dioxide monitoring was not used.   Sleep Associated Hypoxemia - (Greater than 5 minutes O2 sat at or below 88%) was present. Baseline oxygen saturation was 92.4%. Lowest oxygen saturation was 84.0%. Time spent less than or equal to 88% was 21.1 minutes. Time spent less than or equal to 89% was 34.2 minutes.     Movement Activity: Negative for movement abnormalities.   Periodic Limb Activity - There were 18 PLMs during the entire study. The PLM index was 4.2 movements per hour. The PLM Arousal Index was 0.5 per hour.     These findings were reviewed with patient.     Past medical/surgical history, family history, social history, medications and allergies were reviewed.      Problem List:  Patient Active Problem List    Diagnosis Date Noted     Nuclear sclerotic cataract of both eyes 05/24/2021     Priority: Medium     Added automatically from request for surgery [id number]       Regular astigmatism of both eyes 05/24/2021     Priority: Medium     Added automatically from request for surgery [id number]       Ear ringing 09/09/2013     Priority: Medium        Impression/Plan:    1. Severe Obstructive sleep apnea    PSG result was reviewed in detail. We discussed severe sleep apnea, consequences, and management options.  Patient has a history of insomnia which is adequately managed with the use of mirtazapine.  Treatment options including CPAP and dental appliance were reviewed in detail.  CPAP therapy is recommended for severe sleep apnea patient opts to start therapy.    Plan:     1.  Start AutoPap therapy with a pressure range of 5 to 15 cm H2O  2.  Follow-up in 1 to 2 months after starting treatment    I spent a total of 50 minutes for this appointment on this date of service which include time spent before, during and after the visit for chart review, patient care, counseling and  coordination of care.    Dr. Jono Bella     CC: Cabrera Valentine

## 2022-05-13 ENCOUNTER — TELEPHONE (OUTPATIENT)
Dept: SLEEP MEDICINE | Facility: CLINIC | Age: 57
End: 2022-05-13
Payer: COMMERCIAL

## 2022-05-13 NOTE — TELEPHONE ENCOUNTER
I called patient to let her know [facility] received all documentation for new CPAP setup, and that she is now ready to schedule. Patient is scheduled for setup on 5/25/2022 at 9am in [facility] [place] showroom with Neha. Sent message to patient on [employer] with address, date, and time of appointment.

## 2022-05-27 ENCOUNTER — DOCUMENTATION ONLY (OUTPATIENT)
Dept: SLEEP MEDICINE | Facility: CLINIC | Age: 57
End: 2022-05-27
Payer: COMMERCIAL

## 2022-05-27 NOTE — PROGRESS NOTES
STM Recheck:  Patient called with some questions abut getting a CPAP.  Explained the process how she can choose other CPAP DME's vendors.  Patient asked about cost explained to her I don't specialize with insurance.

## 2022-06-03 ENCOUNTER — DOCUMENTATION ONLY (OUTPATIENT)
Dept: SLEEP MEDICINE | Facility: CLINIC | Age: 57
End: 2022-06-03
Payer: COMMERCIAL

## 2022-06-03 ENCOUNTER — TELEPHONE (OUTPATIENT)
Dept: SLEEP MEDICINE | Facility: CLINIC | Age: 57
End: 2022-06-03

## 2022-06-03 NOTE — PROGRESS NOTES
STM Recheck:  Patient called and wants to be scheduled with CPAP.  Patient to call [facility] in [facility] to schedule.

## 2022-06-03 NOTE — TELEPHONE ENCOUNTER
I called patient to schedule a new CPAP setup appointment. She was originally scheduled on 5/25/2022 with my coworker Neha at [facility] showroom, but wanted further information about what her insurance coverage was and what price she would be liable for, so she was not set up that day. Patient is now scheduled with me for 6/14/2022 at 2:30pm in [employer] showroom. Confirmed address, date, and time with patient; also sent message on [employer] with appointment details.

## 2022-06-14 ENCOUNTER — APPOINTMENT (OUTPATIENT)
Dept: SLEEP MEDICINE | Facility: CLINIC | Age: 57
End: 2022-06-14
Payer: COMMERCIAL

## 2022-06-14 ENCOUNTER — DOCUMENTATION ONLY (OUTPATIENT)
Dept: SLEEP MEDICINE | Facility: CLINIC | Age: 57
End: 2022-06-14
Payer: COMMERCIAL

## 2022-06-14 DIAGNOSIS — G47.33 OSA (OBSTRUCTIVE SLEEP APNEA): Primary | ICD-10-CM

## 2022-06-14 NOTE — PROGRESS NOTES
Patient was offered choice of vendor and chose [facility].  Patient Deana Garcia was set up at [facility]  on June 14, 2022. Patient received a Resmed Airsense 11 Pressures were set at 5-15 cm H2O.   Patient s ramp is 5 cm H2O for Auto and FLEX/EPR is EPR, 2.  Patient received a Respironics Mask name: Dreamwear Nasal mask size Standard, heated tubing and heated humidifier.  Patient does not need to meet compliance. Patient declined to schedule sleep follow up at this time.   Julissa Olmedo

## 2022-06-16 ENCOUNTER — TELEPHONE (OUTPATIENT)
Dept: SLEEP MEDICINE | Facility: CLINIC | Age: 57
End: 2022-06-16
Payer: COMMERCIAL

## 2022-06-16 NOTE — TELEPHONE ENCOUNTER
Occupational Therapy  OCCUPATIONAL THERAPY INITIAL EVALUATION    [address] [address] [address]    Date:2021                                                 Patient Name: Estefany Alston  MRN: [id number]  : 1947  Room: [address] [place] [address]    Evaluating OT: Nimo Covington OTR/L #[id number]  Referring Provider: UYEN Talbot CNP  Specific Provider Orders: OT eval and treat; 6/3/21    Diagnosis: UTI (urinary tract infection) [N39.0]  Reason for admission: fall (imaging (-) for fractures)  Surgery/Procedure: none  Pertinent Medical History: a-fib, anxiety, arthritis, CHF, HTN, osteopenia,        Precautions:  Fall Risk    Assessment of current deficits   [x] Functional mobility  [x]ADLs  [x] Strength               []Cognition   [x] Functional transfers   [x] IADLs         [x] Safety Awareness   [x]Endurance   [] Fine Coordination              [x] Balance      [] Vision/perception   []Sensation    []Gross Motor Coordination  [] ROM  [] Delirium                   [] Motor Control     OT PLAN OF CARE   OT POC based on physician orders, patient diagnosis and results of clinical assessment    Frequency/Duration: 1-3 days/wk for 2 weeks PRN   Specific OT Treatment to include:   * Instruction/training on adapted ADL techniques and AE recommendations to increase functional independence  * Training on energy conservation strategies, correct breathing pattern and techniques to improve independence/tolerance for self-care routine  * Functional transfer/mobility training/DME recommendations for increased independence, safety, and fall prevention  * Patient/Family education to increase follow through with safety techniques and functional independence  * Recommendation of environmental modifications for increased safety with functional transfers/mobility and ADLs  * Therapeutic exercise to improve motor endurance, ROM, and functional strength for ` CALLED AND LVM FOR PT LETTING THEM KNOW WE CAN HELP THEM TROUBLESHOOT THEIR MASK. LEFT CALL BACK NUMBER    ADLs/functional transfers  * Therapeutic activities to facilitate/challenge dynamic balance, stand tolerance for increased safety and independence with ADLs      Recommended Adaptive Equipment:  ww, TBD for additional equipment    Home Living: Pt lives alone in a 1 story house w/ 1 PARADISE(no handrail); laundry in basement w/ 13 steps (1 handrail)   Bathroom setup: [place] [id number] owned: none    Prior Level of Function: Independent with ADLs , Independent with IADLs; ambulated w/ no AD  Driving: Yes    Pain Level: Pt reports 0/10 pain this session  Cognition: A&O: 4/4; Follows 1-2 step directions   Memory:  good   Sequencing:  good   Problem solving:  Fair+   Judgement/safety:  Fair+     Functional Assessment:  AM-PAC Daily Activity Raw Score: 19/24   Initial Eval Status  Date: 6/4/21 Treatment Status  Date: STGs = LTGs  Time frame: 10-14 days   Feeding Independent      Grooming Stand by Assist (standing at sink)  Independent    UB Dressing Stand by Assist (seated EOB)  Independent    LB Dressing Stand by Assist   Independent    Bathing Stand by Assist  Independent    Toileting Stand by Assist   Independent    Bed Mobility  Supine to sit: NT   Sit to supine: NT    Pt seated EOB beginning/end of session    Supine to sit: Independent   Sit to supine: Independent    Functional Transfers Stand by Assist   Independent    Functional Mobility CGA w/ no AD (few steps)    SBA w/ ww (longer household distance in hallway)  Modified Carbon    Balance Sitting:     Static:  Indep    Dynamic:SBA  Standing: SBA w/ ww  Sitting:     Static:  Indep    Dynamic:Indep  Standing: Mod.  I   Activity Tolerance Fair+  good   Visual/  Perceptual Glasses: Yes    WFL                Hand Dominance R   AROM (PROM) Strength Additional Info:    RUE  WFL 4-/5 good  and wfl FMC/dexterity noted during ADL tasks       LUE WFL 4-/5 good  and wfl FMC/dexterity noted during ADL tasks       [facility]   Sensation:  No c/o numbness or tingling   Tone: WFL   Edema: none noted    Comments: Obtained nursing clearance prior to session. Upon arrival patient seated EOB eating lunch. Pt demonstrating fair+ understanding of education/techniques, requiring additional training / education. At end of session, patient seated EOB finishing lunch with call light and phone within reach, all lines and tubes intact. Pt instructed on use of call light for assistance and fall prevention. Line management and environmental modifications made prior to and end of session to ensure patient safety and to increase efficiency of session. Skilled monitoring of HR, O2 saturation, blood pressure and patient's response to activity performed throughout session. Overall pt demonstrated decreased independence and safety during completion of ADL/functional transfers/mobility tasks. Pt would benefit from continued skilled OT to increase safety and independence with completion of ADL/IADL tasks for functional independence and quality of life. Treatment: Therapist facilitated functional transfers (EOB, commode, sit<>stand w/ cueing on hand placement for safety), standing tolerance tasks(addressing posture and incorporating light functional reaching impacting ADLs) and functional mobility task with ww (cuing on posture, w/w management and safety) - skilled cuing on hand placement, posture, body mechanics and safety. Pt demo increased independence and safety with use of ww. Therapist facilitated self-care retraining: UB/LB self-care tasks(socks-utilizing cross over leg technique for safety), simulated toileting task(in bathroom) and standing grooming task(washed hands at sink) while educating pt on modified techniques, posture, safety and energy conservation techniques. Skilled monitoring of HR, O2 sats and pts response to treatment.        Rehab Potential: Good for established goals     Patient / Family Goal: not stated      Patient and/or family were instructed on functional diagnosis, prognosis/goals and OT plan of care. Demonstrated good understanding. Eval Complexity: Low    Time In: 1:30  Time Out: 1:50  Total Treatment Time: 10 minutes    Min Units   OT Eval Low 97165  x  1   OT Eval Medium 39695      OT Eval High 30743       OT Re-Eval [id number]       Therapeutic Ex 61066       Therapeutic Activities 47390  2     ADL/Self Care 82637  8  1   Orthotic Management 08634       Neuro Re-Ed 76013       Non-Billable Time          Evaluation Time includes thorough review of current medical information, gathering information on past medical history/social history and prior level of function, completion of standardized testing/informal observation of tasks, assessment of data and education on plan of care and goals.             Harpreet Ruano, OTR/L #[id number]

## 2022-07-11 ENCOUNTER — DOCUMENTATION ONLY (OUTPATIENT)
Dept: SLEEP MEDICINE | Facility: CLINIC | Age: 57
End: 2022-07-11

## 2022-07-12 NOTE — PROGRESS NOTES
STM Recheck:  Patient called to see if she needs a follow visit.  Told her we like to see the patient 7 to 8 weeks after CPAP set up.  Patient not sure what she is going to do.

## 2022-07-15 ENCOUNTER — DOCUMENTATION ONLY (OUTPATIENT)
Dept: SLEEP MEDICINE | Facility: CLINIC | Age: 57
End: 2022-07-15

## 2022-07-15 NOTE — PROGRESS NOTES
30 DAY STM VISIT    Diagnostic AHI: 40  PSG    Data only recheck     Assessment: Pt meeting objective benchmarks.     Action plan: pt to have 6 month STM visit  Patient has not scheduled a follow up visit.   Device type: Auto-CPAP  PAP settings: CPAP min 5.0 cm  H20     CPAP max 15.0 cm  H20    95th% pressure 9 cm  H20      RESMED EPR level Setting: TWO    RESMED Soft response setting:  OFF  Mask type:  Nasal Mask  Objective measures: 14 day rolling measures      Compliance  100 %      Leak  0.16 lpm  last  upload      AHI 0.75   last  upload      Average number of minutes 482      Objective measure goal  Compliance   Goal >70%  Leak   Goal < 24 lpm  AHI  Goal < 5  Usage  Goal >240        Total time spent on accessing and interpreting remote patient PAP therapy data  10 minutes    Total time spent counseling, coaching  and reviewing PAP therapy data with patient  0 minutes     [id number] this call  [id number] no  at 3 or 14 day [facility]

## 2022-08-30 ENCOUNTER — NURSE TRIAGE (OUTPATIENT)
Dept: NURSING | Facility: CLINIC | Age: 57
End: 2022-08-30

## 2022-08-30 NOTE — TELEPHONE ENCOUNTER
S-(situation): right thumb pain x 1 month    B-(background):   Pt woke up a month ago with right thumb pain.  Thinks she may have injured it while asleep.      A-(assessment):   Right thumb joint clicks funny  Sharp pains, rated 4/10  When trying to  something, pain reaches to 6/10    Hard to do things using the thumb, unable to  properly.    No obvious deformity, no discoloration    R-(recommendations):   Be seen within 3 days  Pt would like to be seen at the orthopedic clinic. FNA recommended same day access and transferred her to [facility].    Tracie Pendleton RN/[place] Nurse Advisor              Reason for Disposition    MODERATE pain (e.g., interferes with normal activities) and present > 3 days    Additional Information    Negative: Looks infected (spreading redness, red streak, pus) and severe pain with movement    Negative: Patient sounds very sick or weak to the triager    Negative: SEVERE pain (e.g., excruciating, unable to use hand at all)    Negative: Looks infected (e.g., spreading redness, red streak, pus)    Negative: Yellow pus under skin around fingernail (cuticle) or pus under fingernail    Negative: Painful blisters on fingertip    Negative: Numbness (i.e., loss of sensation) in hand or fingers of new-onset    Protocols used: FINGER PAIN-A-OH

## 2022-08-31 ENCOUNTER — ANCILLARY PROCEDURE (OUTPATIENT)
Dept: GENERAL RADIOLOGY | Facility: CLINIC | Age: 57
End: 2022-08-31
Attending: FAMILY MEDICINE
Payer: COMMERCIAL

## 2022-08-31 ENCOUNTER — OFFICE VISIT (OUTPATIENT)
Dept: ORTHOPEDICS | Facility: CLINIC | Age: 57
End: 2022-08-31
Payer: COMMERCIAL

## 2022-08-31 VITALS — HEIGHT: 66 IN | BODY MASS INDEX: 35.2 KG/M2 | WEIGHT: 219 LBS

## 2022-08-31 DIAGNOSIS — M25.541 ARTHRALGIA OF RIGHT HAND: Primary | ICD-10-CM

## 2022-08-31 DIAGNOSIS — M25.541 ARTHRALGIA OF RIGHT HAND: ICD-10-CM

## 2022-08-31 DIAGNOSIS — M65.311 TRIGGER THUMB OF RIGHT HAND: Primary | ICD-10-CM

## 2022-08-31 PROCEDURE — 73130 X-RAY EXAM OF HAND: CPT | Mod: RT | Performed by: RADIOLOGY

## 2022-08-31 PROCEDURE — 99203 OFFICE O/P NEW LOW 30 MIN: CPT | Mod: GC | Performed by: FAMILY MEDICINE

## 2022-08-31 NOTE — PROGRESS NOTES
Attending Note:   I have  examined this patient/images and have reviewed the clinical presentation and progress note with the reisdent. I agree with the treatment plan as outlined. The plan was formulated with the resident on the day of the patient's visit.  Frida Noonan MD, CAQ, CCD  [facility]  Sports Medicine and Bone Health

## 2022-08-31 NOTE — LETTER
8/31/2022      RE: Deana Garcia  [address]  [address]  [facility] [address]     Dear Colleague,    Thank you for referring your patient, Deana Garcia, to the [facility]. Please see a copy of my visit note below.     Subjective:   Deana Garcia is a 56 year old female who presents to clinic with right thumb pain that started about 1 month ago. She reports that she woke up and her thumb was in a hyperextended position. She is unsure if she twisted it in her bedding while she was sleeping. Since then, she has had right thumb soreness with mild improvement. Pain is worse with gripping, mousing. Pain is located over the IP and CMC joint. Denies any paresthesias. She is not currently taking any medication for the pain. She did obtained a compression wrap/brace from [employer], but is unsure if it fit well. She is right hand dominant.     Describes pain as sharp. Radiates into palm. Describes a click in the thumb with extension. Sometimes dropping things, doesn't know if d/t pain or weakness. Has been using left hand a lot. 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 Denies n/t distally.    Hasn't been taking any OTC analgesics b/c she notes she's very pain tolerant. Only using splint at night b/c she can't type with it on.      PAST MEDICAL, SOCIAL, SURGICAL AND FAMILY HISTORY:     Past Medical History:   Diagnosis Date     Anxiety      Asthma      Depression      High blood pressure      Hypertension      Major depressive disorder, recurrent episode, mild (H)      PONV (postoperative nausea and vomiting)      Ringing in ears      Rotator cuff injury     left     Past Surgical History:   Procedure Laterality Date     CHOLECYSTECTOMY       DILATION AND CURETTAGE N/A 7/31/2015    Procedure: DILATION AND CURETTAGE;  Surgeon: Pooja Hunter MD;  Location: [facility] OR     ENT SURGERY      sinus surgery     GALLBLADDER SURGERY  7/2007     GYN  "SURGERY      dysplasia of cervix     OPERATIVE HYSTEROSCOPY WITH MORCELLATOR N/A 7/31/2015    Procedure: OPERATIVE HYSTEROSCOPY WITH MORCELLATOR;  Surgeon: Pooja Hunter MD;  [facility] OR     PHACOEMULSIFICATION CLEAR CORNEA WITH TORIC INTRAOCULAR LENS IMPLANT Right 6/18/2021    Procedure: RIGHT EYE CATARACT REMOVAL WITH INTRAOCULAR TORIC LENS IMPLANT;  Surgeon: Анна Mishra MD;  Location: [facility] OR     PHACOEMULSIFICATION CLEAR CORNEA WITH TORIC INTRAOCULAR LENS IMPLANT Left 6/25/2021    Procedure: LEFT EYE CATARACT REMOVAL WITH INTRAOCULAR TORIC LENS IMPLANT;  Surgeon: Анна Mishra MD;  Location: [facility] OR     uterine polype[  5/2007     ALLERGIES: She is allergic to bees, dust mites, scopolamine, sulfa drugs, and ivp dye [contrast dye].    CURRENT MEDICATIONS: She has a current medication list which includes the following prescription(s): albuterol, alprazolam, atenolol, hydrochlorothiazide, mirtazapine, vitamin d (cholecalciferol), zolpidem, and artificial tears.     REVIEW OF SYSTEMS: 3 point review of systems is negative except as noted above.     Exam:   Ht 1.676 m (5' 6\")   Wt 99.3 kg (219 lb)   BMI 35.35 kg/m       CONSTITUTIONAL: healthy, alert and no distress  HEAD: Normocephalic. No masses, lesions, tenderness or abnormalities  GAIT: normal    MUSCULOSKELETAL:  Musculoskeletal - R thumb  - inspection: no atrophy, normal joint alignment, no swelling  - palpation: MCP TTP at medial and lateral joint line, volarly over A1 pulley,  no soft tissue tenderness, mild tenderness at the anatomical snuffbox, no TTP over scaphoid tubercle, no CMC TTP  - ROM:  MCP 70 deg flexion   0 deg extension   IP 90 deg flexion   0 deg extension   Intermittent triggering w/ active flexion/extension ranging  - strength: 5/5  strength, 5/5 wrist abduction, 5/5 flexion, extension, pronation, supination, adduction  - special tests:  (-) varus  (-) valgus  (-) CMC grind test  Neuro  - no numbness, no motor deficit, " grossly normal coordination, normal muscle tone  Skin  - no ecchymosis, erythema, warmth, or induration, no obvious rash  Psych  - interactive, appropriate, normal mood and affect         Assessment/Plan:     (M65.311) Trigger thumb of right hand  (primary encounter diagnosis)  Comment: One month of thumb pain 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 Clicking mechanism on her mouse is operated by the thumb rather than index finger, predisposing her to thumb overuse injury. Mild intermittent triggering and TTP over the A1 pulley of the thumb, consistent w/ the above dx.   Plan:   -Hand Therapy Referral   -Custom brace from them  -RICE at home  -Follow up prn        X-RAY INTERPRETATION:   X-Ray of the Right Hand/Fingers: 3-view - final read pending; on our read - mild sclerotic changes and spurring at the MCP joint of the thumb. No fracture or major arthritis.    Attending Note:   I have  examined this patient/images and have reviewed the clinical presentation and progress note with the reisdent. I agree with the treatment plan as outlined. The plan was formulated with the resident on the day of the patient's visit.  Frida Noonan MD, CAQ, CCD  [facility]  Sports Medicine and Bone Health

## 2022-08-31 NOTE — PROGRESS NOTES
Subjective:   Deana Garcia is a 56 year old female who presents to clinic with right thumb pain that started about 1 month ago. She reports that she woke up and her thumb was in a hyperextended position. She is unsure if she twisted it in her bedding while she was sleeping. Since then, she has had right thumb soreness with mild improvement. Pain is worse with gripping, mousing. Pain is located over the IP and CMC joint. Denies any paresthesias. She is not currently taking any medication for the pain. She did obtained a compression wrap/brace from [employer], but is unsure if it fit well. She is right hand dominant.     Describes pain as sharp. Radiates into palm. Describes a click in the thumb with extension. Sometimes dropping things, doesn't know if d/t pain or weakness. Has been using left hand a lot. 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 Denies n/t distally.    Hasn't been taking any OTC analgesics b/c she notes she's very pain tolerant. Only using splint at night b/c she can't type with it on.      PAST MEDICAL, SOCIAL, SURGICAL AND FAMILY HISTORY:     Past Medical History:   Diagnosis Date     Anxiety      Asthma      Depression      High blood pressure      Hypertension      Major depressive disorder, recurrent episode, mild (H)      PONV (postoperative nausea and vomiting)      Ringing in ears      Rotator cuff injury     left     Past Surgical History:   Procedure Laterality Date     CHOLECYSTECTOMY       DILATION AND CURETTAGE N/A 7/31/2015    Procedure: DILATION AND CURETTAGE;  Surgeon: Pooja Hunter MD;  [facility]     ENT SURGERY      sinus surgery     GALLBLADDER SURGERY  7/2007     GYN SURGERY      dysplasia of cervix     OPERATIVE HYSTEROSCOPY WITH MORCELLATOR N/A 7/31/2015    Procedure: OPERATIVE HYSTEROSCOPY WITH MORCELLATOR;  Surgeon: Pooja Hunter MD;  [facility]     PHACOEMULSIFICATION CLEAR CORNEA WITH TORIC INTRAOCULAR LENS IMPLANT  "Right 6/18/2021    Procedure: RIGHT EYE CATARACT REMOVAL WITH INTRAOCULAR TORIC LENS IMPLANT;  Surgeon: Анна Mishra MD;  Location: [facility] OR     PHACOEMULSIFICATION CLEAR CORNEA WITH TORIC INTRAOCULAR LENS IMPLANT Left 6/25/2021    Procedure: LEFT EYE CATARACT REMOVAL WITH INTRAOCULAR TORIC LENS IMPLANT;  Surgeon: Анна Mishra MD;  Location: [facility] OR     uterine polype[  5/2007     ALLERGIES: She is allergic to bees, dust mites, scopolamine, sulfa drugs, and ivp dye [contrast dye].    CURRENT MEDICATIONS: She has a current medication list which includes the following prescription(s): albuterol, alprazolam, atenolol, hydrochlorothiazide, mirtazapine, vitamin d (cholecalciferol), zolpidem, and artificial tears.     REVIEW OF SYSTEMS: 3 point review of systems is negative except as noted above.     Exam:   Ht 1.676 m (5' 6\")   Wt 99.3 kg (219 lb)   BMI 35.35 kg/m       CONSTITUTIONAL: healthy, alert and no distress  HEAD: Normocephalic. No masses, lesions, tenderness or abnormalities  GAIT: normal    MUSCULOSKELETAL:  Musculoskeletal - R thumb  - inspection: no atrophy, normal joint alignment, no swelling  - palpation: MCP TTP at medial and lateral joint line, volarly over A1 pulley,  no soft tissue tenderness, mild tenderness at the anatomical snuffbox, no TTP over scaphoid tubercle, no CMC TTP  - ROM:  MCP 70 deg flexion   0 deg extension   IP 90 deg flexion   0 deg extension   Intermittent triggering w/ active flexion/extension ranging  - strength: 5/5  strength, 5/5 wrist abduction, 5/5 flexion, extension, pronation, supination, adduction  - special tests:  (-) varus  (-) valgus  (-) CMC grind test  Neuro  - no numbness, no motor deficit, grossly normal coordination, normal muscle tone  Skin  - no ecchymosis, erythema, warmth, or induration, no obvious rash  Psych  - interactive, appropriate, normal mood and affect         Assessment/Plan:     (M65.311) Trigger thumb of right hand  (primary encounter " diagnosis)  Comment: 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 Clicking mechanism on her mouse is operated by the thumb rather than index finger, predisposing her to thumb overuse injury. Mild intermittent triggering and TTP over the A1 pulley of the thumb, consistent w/ the above dx.   Plan:   -Hand Therapy Referral   -Custom brace from them  -RICE at home  -Follow up prn        X-RAY INTERPRETATION:   X-Ray of the Right Hand/Fingers: 3-view - final read pending; on our read - mild sclerotic changes and spurring at the MCP joint of the thumb. No fracture or major arthritis.

## 2022-09-01 NOTE — PROGRESS NOTES
Hand Therapy Initial Evaluation    Current Date:  9/9/2022    Diagnosis: Trigger thumb of right hand   DOI: 8/30/22 per patient     Subjective:  Deana Garcia is a 56 year old female.    Patient reports symptoms of the right thumb which occurred due to malpositioning of thumb into hyperextension during sleep. Since onset symptoms are Gradually getting better since injury, but still bothers and painful with use.  General health as reported by patient is fair.  Pertinent medical history includes: asthma, concussions/dizziness, depression, high blood pressure, menopausal, overweight, sleep disorder/apnea.  Medical allergies: other (IV contrast dye)  Surgical history: tonsils, gall bladder, cervical conization, cataract.  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[employer]  Job Tasks: Computer Work, Prolonged Sitting  (typing and using a thumb-operated computer mouse)     Occupational Profile Information:  Right hand dominant  Prior functional level:  no limitations  Patient reports symptoms of pain and stiffness/loss of motion  Special tests: x-ray of right hand on 8/31/22 (impression: no acute osseous abnormality)   Previous treatment: brace  Barriers include:none  Mobility: No difficulty  Transportation: drives  Currently working in normal job without restrictions    Functional Outcome Measure:   Upper Extremity Functional Index Score:  SCORE:   Column Totals: /80: (P) 61   (A lower score indicates greater disability.)    Objective  Pain Level (Scale 0-10)   9/9/2022   At Rest 1/10   With Use 4-6/10     Pain Description  Date 9/9/2022   Location R thumb (A1 pulley)   Pain Quality Aching and Sharp (initally)   Frequency intermittent     Pain is worst  daytime   Exacerbated by  with use or any forceful tasks   Relieved by None    Progression Gradually getting better, but still painful and limiting ADLs     Sensation   WNL  throughout all nerve distributions; per patient report    ROM  Pain Report:  - none  + mild    ++ moderate    +++ severe   Thumb 9/9/2022 9/9/2022   AROM  (PROM) L R   MP 0/55 0/50   IP +3/52 +2/50   RABD 80 70   PABD 80 73     Stage of Stenosing Tenosynovitis (SST)     9/9/2022   R thumb  3/5   Stage 1:  Normal  Stage 2:  Uneven motion of tendon  Stage 3:  Triggering, clicking, catching  Stage 4:  Locking in extension or flexion; unlocked by active motion  Stage 5:  Locking in extension or flexion; unlocked by passive motion  Stage 6:  Finger locked in extension or flexion    Strength   Testing deferred     Assessment:  Patient presents with symptoms consistent with diagnosis of right thumb trigger, with conservative intervention.     Patient's limitations or Problem List includes:  Pain, Decreased ROM/motion and Tightness in musculature of the right thumb which interferes with the patient's ability to perform Self Care Tasks (dressing), Work Tasks, Sleep Patterns, Recreational Activities and Household Chores as compared to previous level of function.    Rehab Potential:  Excellent - Return to full activity, no limitations    Patient will benefit from skilled Occupational Therapy to increase ROM and flexibility and decrease pain to return to previous activity level and resume normal daily tasks and to reach their rehab potential.    Barriers to Learning:  No barrier    Communication Issues:  Patient appears to be able to clearly communicate and understand verbal and written communication and follow directions correctly.    Chart Review: Brief history including review of medical and/or therapy records relating to the presenting problem    Identified Performance Deficits: dressing, home establishment and management, meal preparation and cleanup, shopping, sleep, work and leisure activities    Assessment of Occupational Performance:  5 or more Performance Deficits    Clinical Decision Making (Complexity): Low  complexity    Treatment Explanation:  The following has been discussed with the patient:  RX ordered/plan of care  Anticipated outcomes  Possible risks and side effects    Plan:  Frequency:  1 X week, once daily  Duration:  for 4 weeks    Treatment Plan:    Modalities:    US, Fluidotherapy and Paraffin   Therapeutic Exercise:    AROM, AAROM, PROM, Tendon Gliding, Blocking, Place and Hold, Isotonics and Isometrics  Neuromuscular re-ed:   Kinesiotaping and Isometrics  Manual Techniques:   Friction massage and Myofascial release  Orthotic Fabrication:    Static   Self Care:    Self Care Tasks, Ergonomic Considerations and Work Tasks    Discharge Plan:  Achieve all LTG.  Independent in home treatment program.  Reach maximal therapeutic benefit.    Home Exercise Program:  Orthosis Wear and Care  EMR Notes  HEP - Sets  Reps  Sessions per day  Notes continue wear for up to 10 days; if pain is diminished, then progressed to nighttime only perform skin checks, patient to return therapy if adjustment is needed  Friction Massage  EMR Notes  HEP - Sets 1-2 times a day  Reps 3-5 minutes per session  Sessions per day  Notes Massage along the palm and webspace  Thumb Active Range of Motion Composite Flexion  EMR Notes  HEP - Sets 2-3 sets  Reps 1o reps  Sessions per day  Notes only start when there is no pain; must be pain-free  Thumb Active Range of Motion IP Joint Blocking  EMR Notes  HEP - Sets 2-3 sets  Reps 10 reps  Sessions per day  Notes only start when there is no pain; must be pain-free      Next Visit:  Check splint fitting for adjustment, as appropriate   Cont. HEP and joint protection   Progress to gentle thumb AROM if improved symptoms   MFR

## 2022-09-09 ENCOUNTER — THERAPY VISIT (OUTPATIENT)
Dept: OCCUPATIONAL THERAPY | Facility: CLINIC | Age: 57
End: 2022-09-09
Attending: FAMILY MEDICINE
Payer: COMMERCIAL

## 2022-09-09 DIAGNOSIS — M79.644 PAIN OF RIGHT THUMB: ICD-10-CM

## 2022-09-09 DIAGNOSIS — M65.311 TRIGGER THUMB OF RIGHT HAND: ICD-10-CM

## 2022-09-09 PROCEDURE — 97760 ORTHOTIC MGMT&TRAING 1ST ENC: CPT | Mod: GO

## 2022-09-09 PROCEDURE — 97165 OT EVAL LOW COMPLEX 30 MIN: CPT | Mod: GO

## 2022-09-21 ENCOUNTER — THERAPY VISIT (OUTPATIENT)
Dept: OCCUPATIONAL THERAPY | Facility: CLINIC | Age: 57
End: 2022-09-21
Payer: COMMERCIAL

## 2022-09-21 DIAGNOSIS — M79.644 PAIN OF RIGHT THUMB: Primary | ICD-10-CM

## 2022-09-21 PROCEDURE — 97110 THERAPEUTIC EXERCISES: CPT | Mod: GO | Performed by: OCCUPATIONAL THERAPIST

## 2022-09-21 PROCEDURE — 97535 SELF CARE MNGMENT TRAINING: CPT | Mod: GO | Performed by: OCCUPATIONAL THERAPIST

## 2022-09-21 NOTE — PROGRESS NOTES
SOAP note information for 9/21/2022.  Please refer to the daily flowsheet for treatment today, total treatment time and time spent performing 1:1 timed codes.       Diagnosis: Trigger thumb of right hand   DOI: 8/30/22 per patient     Subjective:  R hand: the thumb was clicking a lot a couple days ago. Not so much right now.   Mostly the top joint that is the problem.  L hand: she burned her hand on a hot pain.    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, Prolonged Sitting  (typing and using a thumb-operated computer mouse)   Currently working in normal job without restrictions    Objective  Pain Level (Scale 0-10)   9/9/2022   At Rest 1/10   With Use 4-6/10     Pain Description  Date 9/9/2022   Location R thumb (A1 pulley)   Pain Quality Aching and Sharp (initally)   Frequency intermittent     Pain is worst  daytime   Exacerbated by  with use or any forceful tasks   Relieved by None    Progression Gradually getting better, but still painful and limiting ADLs     Sensation   WNL throughout all nerve distributions; per patient report    ROM  Pain Report:  - none  + mild    ++ moderate    +++ severe   Thumb 9/9/2022 9/9/2022   AROM  (PROM) L R   MP 0/55 0/50   IP +3/52 +2/50   RABD 80 70   PABD 80 73     VISUAL INSPECTION:  DATE 9/22/2022    Right   edema  mild of the entire thumb and thenar eminence.  Notable edema at the level of the A1 pulley.   Other observations:   Palpable crepitus, edema at the level of the A1 pulley.  Triggering occurs with 15 degrees of IP flexion and beyond, and triggering easily occurs even with thumb passive flexion          Stage of Stenosing Tenosynovitis (SST)     9/9/2022   R thumb  3/5   Stage 1:  Normal  Stage 2:  Uneven motion of tendon  Stage 3:  Triggering, clicking, catching  Stage 4:  Locking in extension or flexion; unlocked by active motion  Stage 5:  Locking in extension or  flexion; unlocked by passive motion  Stage 6:  Finger locked in extension or flexion    Strength   Testing deferred       Next Visit:  Check symptoms, Check splint fitting for adjustment, as appropriate   Cont. HEP and joint protection   Progress to gentle thumb AROM if improved symptoms   MARK

## 2022-10-10 ENCOUNTER — THERAPY VISIT (OUTPATIENT)
Dept: OCCUPATIONAL THERAPY | Facility: CLINIC | Age: 57
End: 2022-10-10
Payer: COMMERCIAL

## 2022-10-10 DIAGNOSIS — M79.644 PAIN OF RIGHT THUMB: Primary | ICD-10-CM

## 2022-10-10 PROCEDURE — 97763 ORTHC/PROSTC MGMT SBSQ ENC: CPT | Mod: GO | Performed by: OCCUPATIONAL THERAPIST

## 2022-10-11 ASSESSMENT — ENCOUNTER SYMPTOMS
NAIL CHANGES: 0
POOR WOUND HEALING: 0
DEPRESSION: 1
SKIN CHANGES: 0
PANIC: 0
NERVOUS/ANXIOUS: 1
DECREASED CONCENTRATION: 0
INSOMNIA: 0

## 2022-10-11 NOTE — PROGRESS NOTES
Chief Complaint:   Chief Complaint   Patient presents with     Consult     Right trigger thumb.  Has been going on since August 1st.  Hs been going to OT for 3 sessions.         Date of Injury: 8/1/2022  Mechanism of Injury: possible hyperextension while sleeping  Diagnosis: right trigger thumb  Treatment:   Hand OT  10/12/2022: right trigger thumb steroid injection    History of Present Illness: Deana Garcia is a 56 year old RHD female presenting for evaluation of right thumb clicking. It began on 8/1/2022 after a possible hyperextension injury while sleeping. She has tried a compression wrap, massage, and Aleve. The Aleve seemed to help a lot. She has clicking and popping in the thumb with motion of her IP joint.     Clinical documentation by Dr. Noonan on 8/31/2022 was reviewed.    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    Past Medical History:   Past Medical History:   Diagnosis Date     Anxiety      Asthma      Depression      High blood pressure      Hypertension      Major depressive disorder, recurrent episode, mild (H)      PONV (postoperative nausea and vomiting)      Ringing in ears      Rotator cuff injury     left       Past Surgical History:   Past Surgical History:   Procedure Laterality Date     CHOLECYSTECTOMY       DILATION AND CURETTAGE N/A 7/31/2015    Procedure: DILATION AND CURETTAGE;  Surgeon: Pooja Hunter MD;  Location: [facility] OR     ENT SURGERY      sinus surgery     GALLBLADDER SURGERY  7/2007     GYN SURGERY      dysplasia of cervix     OPERATIVE HYSTEROSCOPY WITH MORCELLATOR N/A 7/31/2015    Procedure: OPERATIVE HYSTEROSCOPY WITH MORCELLATOR;  Surgeon: Pooja Hunter MD;  Location: [facility] OR     PHACOEMULSIFICATION CLEAR CORNEA WITH TORIC INTRAOCULAR LENS IMPLANT Right 6/18/2021    Procedure: RIGHT EYE CATARACT REMOVAL WITH INTRAOCULAR TORIC LENS IMPLANT;  Surgeon: Анна Mishra MD;  Location: [facility] OR     PHACOEMULSIFICATION CLEAR CORNEA WITH  TORIC INTRAOCULAR LENS IMPLANT Left 6/25/2021    Procedure: LEFT EYE CATARACT REMOVAL WITH INTRAOCULAR TORIC LENS IMPLANT;  Surgeon: Анна Mishra MD;  Location: [facility] OR     uterine polype[  5/2007       Medications:   Current Outpatient Medications:      albuterol (PROAIR HFA, PROVENTIL HFA, VENTOLIN HFA) 108 (90 BASE) MCG/ACT inhaler, Inhale 2 puffs into the lungs every 6 hours, Disp: , Rfl:      ALPRAZolam (XANAX) 0.25 MG tablet, TAKE 1/2 TO 1 TABLET BY MOUTH DAILY AS NEEDED FOR ANXIETY SYMPTOMS., Disp: , Rfl:      artificial tears OINT ophthalmic ointment, At Bedtime, Disp: , Rfl:      atenolol (TENORMIN) 50 MG tablet, Take 100 mg by mouth daily , Disp: , Rfl:      hydrochlorothiazide (HYDRODIURIL) 12.5 MG tablet, , Disp: , Rfl:      mirtazapine (REMERON) 7.5 MG tablet, , Disp: , Rfl:      Vitamin D, Cholecalciferol, 25 MCG (1000 UT) CAPS, , Disp: , Rfl:      zolpidem (AMBIEN) 5 MG tablet, Take tablet by mouth 15 minutes prior to sleep, for Sleep Study, Disp: 1 tablet, Rfl: 0    Allergy:   Allergies   Allergen Reactions     Bees Swelling     Localized swelling     Dust Mites      Scopolamine Other (See Comments)     Blurred vision, dry mouth, motor skills affected     Sulfa Drugs Nausea and Vomiting     Ivp Dye [Contrast Dye] Rash     Needs steroid prior         Social History:   History   Smoking Status     Never   Smokeless Tobacco     Never      Social History     Tobacco Use     Smoking status: Never     Smokeless tobacco: Never   Substance Use Topics     Alcohol use: Yes     Comment: rare use     Drug use: No        Family History:   Family History   Problem Relation Age of Onset     Glaucoma No family hx of      Macular Degeneration No family hx of        Physical Examination:  There were no vitals filed for this visit.  There is no height or weight on file to calculate BMI.    Well appearing, well nourished  Alert and oriented x 3, cooperative with exam     Right thumb  Skin intact    TTP at A1  pulley: yes     Palpable nodule over flexor tendon: yes     Triggering with active/passive digit flexion/extension: yes   Motor Exam: Intact TU/OK/x2-3. 5/5 1st DOI and thumb abduction  Sensory Exam: Sensation intact to light touch in FDWS (radial), volar IF (median), volar SF (ulnar)  Vascular Exam: 2+ radial pulse, < 2 sec capillary refill    Imaging/Studies:  XR (3 views) of the right hand was obtained 8/31/2022.  I reviewed the images and report independently with the patient.  The imaging study shows no fracture/dislocation.  Well maintained joint space.    Assessment: Deana Garcia is a 56 year old female with right trigger thumb.    Plan:   I had a discussion with the patient regarding my clinical findings, diagnosis, and treatment plan. I reviewed the treatment options for trigger digit with the patient (observation, steroid injection, A1 pulley release) as well as the risks and benefits of each.  At this time I recommend a steroid injection.  If symptoms have not completely resolved in 6 weeks, a second injection or surgical intervention may be indicated in the form of A1 pulley release.  The patient understands and agrees to the treatment plan.  All questions answered.       Steroid injection given today.  See procedure note below.      Activity as tolerated.    Procedure Note: After a discussion with Deana Garcia regarding treatment options, we decided to proceed with a steroid injection to the right thumb A1 pulley region.  Risks of the procedure including hyperglycemia, fat atrophy, skin depigmentation tendon/ligament weakening, and infection were discussed prior to injection and verbal consent from Deana Garcia was obtained. The area was prepped with alcohol.  10  mg Kenalog and 0.5 mL of 1% lidocaine was injected.  Deana Garcia tolerated the injection well. A clean dressing was placed over the site of the injection. Deana Garcia was given post injection instructions.       Next  Visit:    Follow-up: 6 weeks    Imaging: None    Plan: Symptom check.  Discuss second injection versus surgical intervention if symptoms persist.       DOMENIC LIAO MD    Answers for HPI/ROS submitted by the patient on 10/11/2022  General Symptoms: No  Skin Symptoms: Yes  HENT Symptoms: No  EYE SYMPTOMS: No  HEART SYMPTOMS: No  LUNG SYMPTOMS: No  INTESTINAL SYMPTOMS: No  URINARY SYMPTOMS: No  GYNECOLOGIC SYMPTOMS: No  BREAST SYMPTOMS: No  SKELETAL SYMPTOMS: No  BLOOD SYMPTOMS: No  NERVOUS SYSTEM SYMPTOMS: No  MENTAL HEALTH SYMPTOMS: Yes  Changes in hair: No  Changes in moles/birth marks: No  Itching: No  Rashes: No  Changes in nails: No  Acne: No  Hair in places you don't want it: No  Change in facial hair: No  Warts: No  Non-healing sores: No  Scarring: No  Flaking of skin: No  Color changes of hands/feet in cold : No  Sun sensitivity: No  Skin thickening: No  Nervous or Anxious: Yes  Depression: Yes  Trouble sleeping: No  Trouble thinking or concentrating: No  Mood changes: No  Panic attacks: No

## 2022-10-11 NOTE — TELEPHONE ENCOUNTER
DIAGNOSIS: RT Thumb Trigger Finger   APPOINTMENT DATE: 10/12/2022   NOTES STATUS DETAILS   OFFICE NOTE from other specialist Internal [facility] Occupational Therapy    08/31/2022- Frida Noonan MD - [facility] Sports Med     MEDICATION LIST Internal    XRAYS (IMAGES & REPORTS) Internal 08/31/2022 - RT hand

## 2022-10-12 ENCOUNTER — PRE VISIT (OUTPATIENT)
Dept: ORTHOPEDICS | Facility: CLINIC | Age: 57
End: 2022-10-12

## 2022-10-12 ENCOUNTER — OFFICE VISIT (OUTPATIENT)
Dept: ORTHOPEDICS | Facility: CLINIC | Age: 57
End: 2022-10-12
Payer: COMMERCIAL

## 2022-10-12 DIAGNOSIS — M65.311 TRIGGER THUMB OF RIGHT HAND: Primary | ICD-10-CM

## 2022-10-12 DIAGNOSIS — M25.649 STIFFNESS OF THUMB JOINT: ICD-10-CM

## 2022-10-12 PROCEDURE — 99203 OFFICE O/P NEW LOW 30 MIN: CPT | Mod: 25 | Performed by: STUDENT IN AN ORGANIZED HEALTH CARE EDUCATION/TRAINING PROGRAM

## 2022-10-12 PROCEDURE — 20550 NJX 1 TENDON SHEATH/LIGAMENT: CPT | Mod: F5 | Performed by: STUDENT IN AN ORGANIZED HEALTH CARE EDUCATION/TRAINING PROGRAM

## 2022-10-12 RX ORDER — LIDOCAINE HYDROCHLORIDE 10 MG/ML
0.5 INJECTION, SOLUTION EPIDURAL; INFILTRATION; INTRACAUDAL; PERINEURAL
Status: SHIPPED | OUTPATIENT
Start: 2022-10-12

## 2022-10-12 RX ADMIN — LIDOCAINE HYDROCHLORIDE 0.5 ML: 10 INJECTION, SOLUTION EPIDURAL; INFILTRATION; INTRACAUDAL; PERINEURAL at 13:57

## 2022-10-12 NOTE — NURSING NOTE
Reason For Visit:   Chief Complaint   Patient presents with     Consult     Right trigger thumb.  Has been going on since August 1st.  Hs been going to OT for 3 sessions.         Primary MD: Cabrera Valentine  Ref. MD: Self Referred    Age: 56 year old    ?  No      There were no vitals taken for this visit.      Pain Assessment  Patient Currently in Pain: No (patient denies any pain in Right thumb)    Hand Dominance Evaluation  Hand Dominance: Right          QuickDASH Assessment  QuickDASH Main 10/11/2022   1. Open a tight or new jar. No difficulty   2. Do heavy household chores (e.g., wash walls, floors) Mild difficulty   3. Carry a shopping bag or briefcase. No difficulty   4. Wash your back. No difficulty   5. Use a knife to cut food. Mild difficulty   6. Recreational activities in which you take some force or impact through your arm, shoulder or hand (e.g., golf, hammering, tennis, etc.). Unable to answer   7. During the past week, to what extent has your arm, shoulder or hand problem interfered with your normal social activities with family, friends, neighbours or groups? Not at all   8. During the past week, were you limited in your work or other regular daily activities as a result of your arm, shoulder or hand problem? Slightly limited   9. Arm, shoulder or hand pain. Mild   10.Tingling (pins and needles) in your arm,shoulder or hand. None   11. During the past week, how much difficulty have you had sleeping because of the pain in your arm, shoulder or hand? No difficulty   Quickdash Ability Score 6.82          Current Outpatient Medications   Medication Sig Dispense Refill     albuterol (PROAIR HFA, PROVENTIL HFA, VENTOLIN HFA) 108 (90 BASE) MCG/ACT inhaler Inhale 2 puffs into the lungs every 6 hours       ALPRAZolam (XANAX) 0.25 MG tablet TAKE 1/2 TO 1 TABLET BY MOUTH DAILY AS NEEDED FOR ANXIETY SYMPTOMS.       artificial tears OINT ophthalmic ointment At Bedtime       atenolol (TENORMIN) 50  MG tablet Take 100 mg by mouth daily        hydrochlorothiazide (HYDRODIURIL) 12.5 MG tablet        mirtazapine (REMERON) 7.5 MG tablet        Vitamin D, Cholecalciferol, 25 MCG (1000 UT) CAPS        zolpidem (AMBIEN) 5 MG tablet Take tablet by mouth 15 minutes prior to sleep, for Sleep Study 1 tablet 0       Allergies   Allergen Reactions     Bees Swelling     Localized swelling     Dust Mites      Scopolamine Other (See Comments)     Blurred vision, dry mouth, motor skills affected     Sulfa Drugs Nausea and Vomiting     Ivp Dye [Contrast Dye] Rash     Needs steroid prior         MADDI ZAMORA, ATC

## 2022-10-12 NOTE — PROGRESS NOTES
Hand / Upper Extremity Injection/Arthrocentesis: R thumb A1    Date/Time: 10/12/2022 1:57 PM  Performed by: Kourtney Scruggs MD  Authorized by: Kourtney Scruggs MD     Indications:  Pain  Needle Size:  25 G  Guidance: landmark    Condition: trigger finger    Location:  Thumb    Site:  R thumb A1  Medications:  10 mg triamcinolone acetonide 10 MG/ML; 0.5 mL lidocaine (PF) 1 %  Outcome:  Tolerated well, no immediate complications  Procedure discussed: discussed risks, benefits, and alternatives    Consent Given by:  Patient  Timeout: timeout called immediately prior to procedure    Prep: patient was prepped and draped in usual sterile fashion        [facility] [address] [id number] [phone]  Dept: [phone]  ______________________________________________________________________________    Patient: Deana Garcia   : 1965   MRN: [id number]   2022    INVASIVE PROCEDURE SAFETY CHECKLIST    Date: 10/12/2022   Procedure: Right thumb cortisone injection  Patient Name: Deana Garcia  MRN: [id number]  YOB: 1965    Action: Complete sections as appropriate. Any discrepancy results in a HARD COPY until resolved.     PRE PROCEDURE:  Patient ID verified with 2 identifiers (name and  or MRN): Yes  Procedure and site verified with patient/designee (when able): Yes  Accurate consent documentation in medical record: Yes  H&P (or appropriate assessment) documented in medical record: Yes  H&P must be up to 20 days prior to procedure and updates within 24 hours of procedure as applicable: Yes  Relevant diagnostic and radiology test results appropriately labeled and displayed as applicable: NA  Procedure site(s) marked with provider initials: NA    TIMEOUT:  Time-Out performed immediately prior to starting procedure, including verbal and active participation of all team members addressing the following:Yes  *  Correct patient identify  * Confirmed that the correct side and site are marked  * An accurate procedure consent form  * Agreement on the procedure to be done  * Correct patient position  * Relevant images and results are properly labeled and appropriately displayed  * The need to administer antibiotics or fluids for irrigation purposes during the procedure as applicable   * Safety precautions based on patient history or medication use    DURING PROCEDURE: Verification of correct person, site, and procedures any time the responsibility for care of the patient is transferred to another member of the care team.       The following medications were given:         Prior to injection, verified patient identity using patient's name and date of birth.  Due to injection administration, patient instructed to remain in clinic for 15 minutes  afterwards, and to report any adverse reaction to me immediately.    Trigger thumb cortisone injection  Medication Name: Kenalog 10 NDC 6248-0768-29  Drug Amount Wasted:  Yes: 40 mg/ml   Vial/Syringe: Single dose vial  Expiration Date:  1/1/2024    Medication Name Lidocaine 1% NDC 5510-162-05    Scribed by Conchis Spring for Dr. Scruggs on October 12, 2022 at 2:00p based on the provider's statements to me.     KRYSTINA Aguirre

## 2022-10-12 NOTE — LETTER
10/12/2022         RE: Deana Garcia  [address]  [address]  [facility] [address]        Dear Colleague,    Thank you for referring your patient, Deana Garcia, to the [facility] [place]. Please see a copy of my visit note below.    Chief Complaint:   Chief Complaint   Patient presents with     Consult     Right trigger thumb.  Has been going on since August 1st.  Hs been going to OT for 3 sessions.         Date of Injury: 8/1/2022  Mechanism of Injury: possible hyperextension while sleeping  Diagnosis: right trigger thumb  Treatment:   Hand OT  10/12/2022: right trigger thumb steroid injection    History of Present Illness: Deana Garcia is a 56 year old RHD female presenting for evaluation of right thumb clicking. It began on 8/1/2022 after a possible hyperextension injury while sleeping. She has tried a compression wrap, massage, and Aleve. The Aleve seemed to help a lot. She has clicking and popping in the thumb with motion of her IP joint.     Clinical documentation by Dr. Noonan on 8/31/2022 was reviewed.    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    Past Medical History:   Past Medical History:   Diagnosis Date     Anxiety      Asthma      Depression      High blood pressure      Hypertension      Major depressive disorder, recurrent episode, mild (H)      PONV (postoperative nausea and vomiting)      Ringing in ears      Rotator cuff injury     left       Past Surgical History:   Past Surgical History:   Procedure Laterality Date     CHOLECYSTECTOMY       DILATION AND CURETTAGE N/A 7/31/2015    Procedure: DILATION AND CURETTAGE;  Surgeon: Pooja Hunter MD;  Location: [facility] OR     ENT SURGERY      sinus surgery     GALLBLADDER SURGERY  7/2007     GYN SURGERY      dysplasia of cervix     OPERATIVE HYSTEROSCOPY WITH MORCELLATOR N/A 7/31/2015    Procedure: OPERATIVE HYSTEROSCOPY WITH MORCELLATOR;  Surgeon: Pooja Hunter MD;   Location: [facility] OR     PHACOEMULSIFICATION CLEAR CORNEA WITH TORIC INTRAOCULAR LENS IMPLANT Right 6/18/2021    Procedure: RIGHT EYE CATARACT REMOVAL WITH INTRAOCULAR TORIC LENS IMPLANT;  Surgeon: Анна Mishra MD;  Location: [facility] OR     PHACOEMULSIFICATION CLEAR CORNEA WITH TORIC INTRAOCULAR LENS IMPLANT Left 6/25/2021    Procedure: LEFT EYE CATARACT REMOVAL WITH INTRAOCULAR TORIC LENS IMPLANT;  Surgeon: Анна Mishra MD;  Location: [facility] OR     uterine polype[  5/2007       Medications:   Current Outpatient Medications:      albuterol (PROAIR HFA, PROVENTIL HFA, VENTOLIN HFA) 108 (90 BASE) MCG/ACT inhaler, Inhale 2 puffs into the lungs every 6 hours, Disp: , Rfl:      ALPRAZolam (XANAX) 0.25 MG tablet, TAKE 1/2 TO 1 TABLET BY MOUTH DAILY AS NEEDED FOR ANXIETY SYMPTOMS., Disp: , Rfl:      artificial tears OINT ophthalmic ointment, At Bedtime, Disp: , Rfl:      atenolol (TENORMIN) 50 MG tablet, Take 100 mg by mouth daily , Disp: , Rfl:      hydrochlorothiazide (HYDRODIURIL) 12.5 MG tablet, , Disp: , Rfl:      mirtazapine (REMERON) 7.5 MG tablet, , Disp: , Rfl:      Vitamin D, Cholecalciferol, 25 MCG (1000 UT) CAPS, , Disp: , Rfl:      zolpidem (AMBIEN) 5 MG tablet, Take tablet by mouth 15 minutes prior to sleep, for Sleep Study, Disp: 1 tablet, Rfl: 0    Allergy:   Allergies   Allergen Reactions     Bees Swelling     Localized swelling     Dust Mites      Scopolamine Other (See Comments)     Blurred vision, dry mouth, motor skills affected     Sulfa Drugs Nausea and Vomiting     Ivp Dye [Contrast Dye] Rash     Needs steroid prior         Social History:   History   Smoking Status     Never   Smokeless Tobacco     Never      Social History     Tobacco Use     Smoking status: Never     Smokeless tobacco: Never   Substance Use Topics     Alcohol use: Yes     Comment: rare use     Drug use: No        Family History:   Family History   Problem Relation Age of Onset     Glaucoma No family hx of      Macular  Degeneration No family hx of        Physical Examination:  There were no vitals filed for this visit.  There is no height or weight on file to calculate BMI.    Well appearing, well nourished  Alert and oriented x 3, cooperative with exam     Right thumb  Skin intact    TTP at A1 pulley: yes     Palpable nodule over flexor tendon: yes     Triggering with active/passive digit flexion/extension: yes   Motor Exam: Intact TU/OK/x2-3. 5/5 1st DOI and thumb abduction  Sensory Exam: Sensation intact to light touch in FDWS (radial), volar IF (median), volar SF (ulnar)  Vascular Exam: 2+ radial pulse, < 2 sec capillary refill    Imaging/Studies:  XR (3 views) of the right hand was obtained 8/31/2022.  I reviewed the images and report independently with the patient.  The imaging study shows no fracture/dislocation.  Well maintained joint space.    Assessment: Deana Garcia is a 56 year old female with right trigger thumb.    Plan:   I had a discussion with the patient regarding my clinical findings, diagnosis, and treatment plan. I reviewed the treatment options for trigger digit with the patient (observation, steroid injection, A1 pulley release) as well as the risks and benefits of each.  At this time I recommend a steroid injection.  If symptoms have not completely resolved in 6 weeks, a second injection or surgical intervention may be indicated in the form of A1 pulley release.  The patient understands and agrees to the treatment plan.  All questions answered.       Steroid injection given today.  See procedure note below.      Activity as tolerated.    Procedure Note: After a discussion with Deana Garcia regarding treatment options, we decided to proceed with a steroid injection to the right thumb A1 pulley region.  Risks of the procedure including hyperglycemia, fat atrophy, skin depigmentation tendon/ligament weakening, and infection were discussed prior to injection and verbal consent from Deana Garcia was  obtained. The area was prepped with alcohol.  10  mg Kenalog and 0.5 mL of 1% lidocaine was injected.  Deana Garcia tolerated the injection well. A clean dressing was placed over the site of the injection. Deana V Green was given post injection instructions.       Next Visit:    Follow-up: 6 weeks    Imaging: None    Plan: Symptom check.  Discuss second injection versus surgical intervention if symptoms persist.       KOURTNEY LIAO MD    Answers for HPI/ROS submitted by the patient on 10/11/2022  General Symptoms: No  Skin Symptoms: Yes  HENT Symptoms: No  EYE SYMPTOMS: No  HEART SYMPTOMS: No  LUNG SYMPTOMS: No  INTESTINAL SYMPTOMS: No  URINARY SYMPTOMS: No  GYNECOLOGIC SYMPTOMS: No  BREAST SYMPTOMS: No  SKELETAL SYMPTOMS: No  BLOOD SYMPTOMS: No  NERVOUS SYSTEM SYMPTOMS: No  MENTAL HEALTH SYMPTOMS: Yes  Changes in hair: No  Changes in moles/birth marks: No  Itching: No  Rashes: No  Changes in nails: No  Acne: No  Hair in places you don't want it: No  Change in facial hair: No  Warts: No  Non-healing sores: No  Scarring: No  Flaking of skin: No  Color changes of hands/feet in cold : No  Sun sensitivity: No  Skin thickening: No  Nervous or Anxious: Yes  Depression: Yes  Trouble sleeping: No  Trouble thinking or concentrating: No  Mood changes: No  Panic attacks: No        Hand / Upper Extremity Injection/Arthrocentesis: R thumb A1    Date/Time: 10/12/2022 1:57 PM  Performed by: Kourtney Liao MD  Authorized by: Kourtney Liao MD     Indications:  Pain  Needle Size:  25 G  Guidance: landmark    Condition: trigger finger    Location:  Thumb    Site:  R thumb A1  Medications:  10 mg triamcinolone acetonide 10 MG/ML; 0.5 mL lidocaine (PF) 1 %  Outcome:  Tolerated well, no immediate complications  Procedure discussed: discussed risks, benefits, and alternatives    Consent Given by:  Patient  Timeout: timeout called immediately prior to procedure    Prep: patient was prepped and draped in usual sterile fashion         [facility] [address] [address]  [phone]  Dept: [phone]  ______________________________________________________________________________    Patient: Deana Garcia   : 1965   MRN: [id number]   2022    INVASIVE PROCEDURE SAFETY CHECKLIST    Date: 10/12/2022   Procedure: Right thumb cortisone injection  Patient Name: Deana Garcia  MRN: [id number]  YOB: 1965    Action: Complete sections as appropriate. Any discrepancy results in a HARD COPY until resolved.     PRE PROCEDURE:  Patient ID verified with 2 identifiers (name and  or MRN): Yes  Procedure and site verified with patient/designee (when able): Yes  Accurate consent documentation in medical record: Yes  H&P (or appropriate assessment) documented in medical record: Yes  H&P must be up to 20 days prior to procedure and updates within 24 hours of procedure as applicable: Yes  Relevant diagnostic and radiology test results appropriately labeled and displayed as applicable: NA  Procedure site(s) marked with provider initials: NA    TIMEOUT:  Time-Out performed immediately prior to starting procedure, including verbal and active participation of all team members addressing the following:Yes  * Correct patient identify  * Confirmed that the correct side and site are marked  * An accurate procedure consent form  * Agreement on the procedure to be done  * Correct patient position  * Relevant images and results are properly labeled and appropriately displayed  * The need to administer antibiotics or fluids for irrigation purposes during the procedure as applicable   * Safety precautions based on patient history or medication use    DURING PROCEDURE: Verification of correct person, site, and procedures any time the responsibility for care of the patient is transferred to another member of the care team.       The following medications were  given:         Prior to injection, verified patient identity using patient's name and date of birth.  Due to injection administration, patient instructed to remain in clinic for 15 minutes  afterwards, and to report any adverse reaction to me immediately.    Trigger thumb cortisone injection  Medication Name: Kenalog 10 NDC 4211-9262-34  Drug Amount Wasted:  Yes: 40 mg/ml   Vial/Syringe: Single dose vial  Expiration Date:  1/1/2024    Medication Name Lidocaine 1% NDC 5510-162-05    Scribed by Conchis Spring for Dr. Scruggs on October 12, 2022 at 2:00p based on the provider's statements to me.     KRYSTINA Aguirre MD

## 2022-11-20 ENCOUNTER — HEALTH MAINTENANCE LETTER (OUTPATIENT)
Age: 57
End: 2022-11-20

## 2022-11-22 NOTE — PROGRESS NOTES
Chief Complaint:   Chief Complaint   Patient presents with     RECHECK     Right trigger thumb.  Is getting better, but still has bouts of pain.       Date of Injury: 8/1/2022  Mechanism of Injury: possible hyperextension while sleeping  Diagnosis: right trigger thumb  Treatment:   Hand OT  10/12/2022: right trigger thumb steroid injection    History of Present Illness: Deana Garcia is a 57 year old RHD female presenting for evaluation of right thumb clicking. It began on 8/1/2022 after a possible hyperextension injury while sleeping. She has tried a compression wrap, massage, and Aleve. The Aleve seemed to help a lot. She has clicking and popping in the thumb with motion of her IP joint.     Interval 11/23/2022: presents for follow up. Reports the steroid injection helped with her pain and triggering of the right thumb. She has dry skin in the region of the injection but no other issues. She also notes painless popping in the left thumb IP joint but it does not feel like the triggering her right thumb felt.    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  There is no height or weight on file to calculate BMI.    Well appearing, well nourished  Alert and oriented x 3, cooperative with exam     Right thumb  Skin intact    TTP at A1 pulley: minimal     Palpable nodule over flexor tendon: yes     Triggering with active/passive digit flexion/extension: no  Motor Exam: Intact TU/OK/x2-3. 5/5 1st DOI and thumb abduction  Sensory Exam: Sensation intact to light touch in FDWS (radial), volar IF (median), volar SF (ulnar)  Vascular Exam: 2+ radial pulse, < 2 sec capillary refill     Imaging/Studies:  XR (3 views) of the right hand was obtained 8/31/2022.  I reviewed the images and report independently with the patient.  The imaging study shows no fracture/dislocation.  Well maintained joint space.    Assessment: Deana Garcia is a  57 year old female with right trigger thumb.    Plan:   I had a discussion with the patient regarding my clinical findings, diagnosis, and treatment plan. Her symptoms have resolved following the steroid injection. At this time I recommend activity as tolerated.  If symptoms recur, I recommend follow-up for discussion of a second steroid injection versus surgical treatment with right thumb A1 pulley release. The patient understands and agrees to the treatment plan.  All questions answered.       Activity as tolerated.    No restrictions.     Next Visit:    Follow-up: as needed      DOMENIC LIAO MD

## 2022-11-23 ENCOUNTER — OFFICE VISIT (OUTPATIENT)
Dept: ORTHOPEDICS | Facility: CLINIC | Age: 57
End: 2022-11-23
Payer: COMMERCIAL

## 2022-11-23 DIAGNOSIS — M65.311 TRIGGER THUMB OF RIGHT HAND: Primary | ICD-10-CM

## 2022-11-23 PROCEDURE — 99212 OFFICE O/P EST SF 10 MIN: CPT | Performed by: STUDENT IN AN ORGANIZED HEALTH CARE EDUCATION/TRAINING PROGRAM

## 2022-11-23 NOTE — NURSING NOTE
Reason For Visit:   Chief Complaint   Patient presents with     RECHECK     Right trigger thumb.  Is getting better, but still has bouts of pain.       Primary MD: Cabrera Valentine  Ref. MD:   Reason For Visit:   Chief Complaint   Patient presents with     RECHECK     Right trigger thumb.  Is getting better, but still has bouts of pain.       Primary MD: Cabrera Valentine  Ref. MD: Dirk    Age: 57 year old    ?  No      There were no vitals taken for this visit.      Pain Assessment  Patient Currently in Pain: Yes  0-10 Pain Scale: 2  Primary Pain Location: Finger (Comment which one) (right thumb)  Pain Descriptors: Intermittent    Hand Dominance Evaluation  Hand Dominance: Right          QuickDASH Assessment  QuickDASH Main 10/11/2022   1. Open a tight or new jar. No difficulty   2. Do heavy household chores (e.g., wash walls, floors) Mild difficulty   3. Carry a shopping bag or briefcase. No difficulty   4. Wash your back. No difficulty   5. Use a knife to cut food. Mild difficulty   6. Recreational activities in which you take some force or impact through your arm, shoulder or hand (e.g., golf, hammering, tennis, etc.). Unable to answer   7. During the past week, to what extent has your arm, shoulder or hand problem interfered with your normal social activities with family, friends, neighbours or groups? Not at all   8. During the past week, were you limited in your work or other regular daily activities as a result of your arm, shoulder or hand problem? Slightly limited   9. Arm, shoulder or hand pain. Mild   10.Tingling (pins and needles) in your arm,shoulder or hand. None   11. During the past week, how much difficulty have you had sleeping because of the pain in your arm, shoulder or hand? No difficulty   Quickdash Ability Score 6.82          Current Outpatient Medications   Medication Sig Dispense Refill     albuterol (PROAIR HFA, PROVENTIL HFA, VENTOLIN HFA) 108 (90 BASE) MCG/ACT inhaler Inhale  2 puffs into the lungs every 6 hours       ALPRAZolam (XANAX) 0.25 MG tablet TAKE 1/2 TO 1 TABLET BY MOUTH DAILY AS NEEDED FOR ANXIETY SYMPTOMS.       atenolol (TENORMIN) 50 MG tablet Take 100 mg by mouth daily        hydrochlorothiazide (HYDRODIURIL) 12.5 MG tablet        mirtazapine (REMERON) 7.5 MG tablet        Vitamin D, Cholecalciferol, 25 MCG (1000 UT) CAPS          Allergies   Allergen Reactions     Bees Swelling     Localized swelling     Dust Mites      Scopolamine Other (See Comments)     Blurred vision, dry mouth, motor skills affected     Sulfa Drugs Nausea and Vomiting     Ivp Dye [Contrast Dye] Rash     Needs steroid prior         MADDI ZAMORA, ATC      Age: 57 year old    ?  No      There were no vitals taken for this visit.      Pain Assessment  Patient Currently in Pain: Yes  0-10 Pain Scale: 2  Primary Pain Location: Finger (Comment which one) (right thumb)  Pain Descriptors: Intermittent    Hand Dominance Evaluation  Hand Dominance: Right          QuickDASH Assessment  QuickDASH Main 10/11/2022   1. Open a tight or new jar. No difficulty   2. Do heavy household chores (e.g., wash walls, floors) Mild difficulty   3. Carry a shopping bag or briefcase. No difficulty   4. Wash your back. No difficulty   5. Use a knife to cut food. Mild difficulty   6. Recreational activities in which you take some force or impact through your arm, shoulder or hand (e.g., golf, hammering, tennis, etc.). Unable to answer   7. During the past week, to what extent has your arm, shoulder or hand problem interfered with your normal social activities with family, friends, neighbours or groups? Not at all   8. During the past week, were you limited in your work or other regular daily activities as a result of your arm, shoulder or hand problem? Slightly limited   9. Arm, shoulder or hand pain. Mild   10.Tingling (pins and needles) in your arm,shoulder or hand. None   11. During the past week, how much difficulty  have you had sleeping because of the pain in your arm, shoulder or hand? No difficulty   Quickdash Ability Score 6.82          Current Outpatient Medications   Medication Sig Dispense Refill     albuterol (PROAIR HFA, PROVENTIL HFA, VENTOLIN HFA) 108 (90 BASE) MCG/ACT inhaler Inhale 2 puffs into the lungs every 6 hours       ALPRAZolam (XANAX) 0.25 MG tablet TAKE 1/2 TO 1 TABLET BY MOUTH DAILY AS NEEDED FOR ANXIETY SYMPTOMS.       atenolol (TENORMIN) 50 MG tablet Take 100 mg by mouth daily        hydrochlorothiazide (HYDRODIURIL) 12.5 MG tablet        mirtazapine (REMERON) 7.5 MG tablet        Vitamin D, Cholecalciferol, 25 MCG (1000 UT) CAPS          Allergies   Allergen Reactions     Bees Swelling     Localized swelling     Dust Mites      Scopolamine Other (See Comments)     Blurred vision, dry mouth, motor skills affected     Sulfa Drugs Nausea and Vomiting     Ivp Dye [Contrast Dye] Rash     Needs steroid prior         MADDI ZAMORA, ATC

## 2022-11-23 NOTE — LETTER
11/23/2022         RE: Deana Garcia  [address]  [address]  [facility] [place] [address]        Dear Colleague,    Thank you for referring your patient, Deana Garcia, to the [facility] [place]. Please see a copy of my visit note below.    Chief Complaint:   Chief Complaint   Patient presents with     RECHECK     Right trigger thumb.  Is getting better, but still has bouts of pain.       Date of Injury: 8/1/2022  Mechanism of Injury: possible hyperextension while sleeping  Diagnosis: right trigger thumb  Treatment:   Hand OT  10/12/2022: right trigger thumb steroid injection    History of Present Illness: Deana Garcia is a 57 year old RHD female presenting for evaluation of right thumb clicking. It began on 8/1/2022 after a possible hyperextension injury while sleeping. She has tried a compression wrap, massage, and Aleve. The Aleve seemed to help a lot. She has clicking and popping in the thumb with motion of her IP joint.     Interval 11/23/2022: presents for follow up. Reports the steroid injection helped with her pain and triggering of the right thumb. She has dry skin in the region of the injection but no other issues. She also notes painless popping in the left thumb IP joint but it does not feel like the triggering her right thumb felt.    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  There is no height or weight on file to calculate BMI.    Well appearing, well nourished  Alert and oriented x 3, cooperative with exam     Right thumb  Skin intact    TTP at A1 pulley: minimal     Palpable nodule over flexor tendon: yes     Triggering with active/passive digit flexion/extension: no  Motor Exam: Intact TU/OK/x2-3. 5/5 1st DOI and thumb abduction  Sensory Exam: Sensation intact to light touch in FDWS (radial), volar IF (median), volar SF (ulnar)  Vascular Exam: 2+ radial pulse, < 2 sec  capillary refill     Imaging/Studies:  XR (3 views) of the right hand was obtained 8/31/2022.  I reviewed the images and report independently with the patient.  The imaging study shows no fracture/dislocation.  Well maintained joint space.    Assessment: Deana Garcia is a 57 year old female with right trigger thumb.    Plan:   I had a discussion with the patient regarding my clinical findings, diagnosis, and treatment plan. Her symptoms have resolved following the steroid injection. At this time I recommend activity as tolerated.  If symptoms recur, I recommend follow-up for discussion of a second steroid injection versus surgical treatment with right thumb A1 pulley release. The patient understands and agrees to the treatment plan.  All questions answered.       Activity as tolerated.    No restrictions.     Next Visit:    Follow-up: as needed      DOMENIC LIAO MD

## 2022-12-01 ENCOUNTER — ANCILLARY PROCEDURE (OUTPATIENT)
Dept: MAMMOGRAPHY | Facility: CLINIC | Age: 57
End: 2022-12-01
Attending: OBSTETRICS & GYNECOLOGY
Payer: COMMERCIAL

## 2022-12-01 DIAGNOSIS — Z12.31 ENCOUNTER FOR SCREENING MAMMOGRAM FOR MALIGNANT NEOPLASM OF BREAST: ICD-10-CM

## 2022-12-01 PROCEDURE — 77063 BREAST TOMOSYNTHESIS BI: CPT | Mod: GC | Performed by: STUDENT IN AN ORGANIZED HEALTH CARE EDUCATION/TRAINING PROGRAM

## 2022-12-01 PROCEDURE — 77067 SCR MAMMO BI INCL CAD: CPT | Mod: GC | Performed by: STUDENT IN AN ORGANIZED HEALTH CARE EDUCATION/TRAINING PROGRAM

## 2022-12-14 PROBLEM — M79.644 PAIN OF RIGHT THUMB: Status: RESOLVED | Noted: 2022-09-09 | Resolved: 2022-12-14

## 2023-05-12 ENCOUNTER — ANCILLARY PROCEDURE (OUTPATIENT)
Dept: GENERAL RADIOLOGY | Facility: CLINIC | Age: 58
End: 2023-05-12
Attending: STUDENT IN AN ORGANIZED HEALTH CARE EDUCATION/TRAINING PROGRAM
Payer: COMMERCIAL

## 2023-05-12 ENCOUNTER — OFFICE VISIT (OUTPATIENT)
Dept: ORTHOPEDICS | Facility: CLINIC | Age: 58
End: 2023-05-12
Payer: COMMERCIAL

## 2023-05-12 DIAGNOSIS — M67.471 GANGLION CYST OF RIGHT FOOT: ICD-10-CM

## 2023-05-12 DIAGNOSIS — M79.671 RIGHT FOOT PAIN: Primary | ICD-10-CM

## 2023-05-12 DIAGNOSIS — M72.2 PLANTAR FASCIITIS OF RIGHT FOOT: ICD-10-CM

## 2023-05-12 PROCEDURE — 99214 OFFICE O/P EST MOD 30 MIN: CPT | Performed by: STUDENT IN AN ORGANIZED HEALTH CARE EDUCATION/TRAINING PROGRAM

## 2023-05-12 PROCEDURE — 73630 X-RAY EXAM OF FOOT: CPT | Mod: RT | Performed by: RADIOLOGY

## 2023-05-12 NOTE — LETTER
5/12/2023      RE: Deana Garcia  [address]  [address]  [facility] [address]     Dear Colleague,    Thank you for referring your patient, Deana Garcia, to the [facility] SPORTS MEDICINE CLIN[facility] [place]. Please see a copy of my visit note below.    [facility]  Sports Medicine Clinic  Clinics and Surgery Center           SUBJECTIVE       Deana Garcia is a 57 year old female presenting to clinic today with right foot pain.  Patient has a past medical diagnosis of plantar fasciitis of the right foot, to which she has been managing for quite some time.  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  She is also been less diligent about rolling out and using other modalities to assist with the pain.  She has never been in the night splint.  She never been in a cam boot.  She is also noticed a small marble type structure in the bottom of her foot.  No numbness or tingling.  No swelling.      PMH, Medications and Allergies were reviewed and updated as needed.    ROS:  As noted above otherwise negative.    Patient Active Problem List   Diagnosis    Ear ringing    Nuclear sclerotic cataract of both eyes    Regular astigmatism of both eyes    Trigger thumb of right hand       Current Outpatient Medications   Medication Sig Dispense Refill    albuterol (PROAIR HFA, PROVENTIL HFA, VENTOLIN HFA) 108 (90 BASE) MCG/ACT inhaler Inhale 2 puffs into the lungs every 6 hours      ALPRAZolam (XANAX) 0.25 MG tablet TAKE 1/2 TO 1 TABLET BY MOUTH DAILY AS NEEDED FOR ANXIETY SYMPTOMS.      atenolol (TENORMIN) 50 MG tablet Take 100 mg by mouth daily       hydrochlorothiazide (HYDRODIURIL) 12.5 MG tablet       mirtazapine (REMERON) 7.5 MG tablet       Vitamin D, Cholecalciferol, 25 MCG (1000 UT) CAPS               OBJECTIVE:       Vitals: There were no vitals filed for this visit.  BMI:  There is no height or weight on file to calculate BMI.    Gen:  Well nourished and in no acute distress  HEENT: Extraocular movement intact  Neck: Supple  Pulm:  Breathing Comfortably. No increased respiratory effort.  Psych: Euthymic. Appropriately answers questions    MSK: Right foot and ankle without evidence of an effusion.  No overlying erythema or ecchymosis.  On the bottom of the foot, just distal to the sesamoid, there is a round ovoid mobile nodule felt that does seem consistent with bursal/ganglion cyst.  It is nonpainful.  Tenderness to palpation at the medial aspect of the insertion of the plantar fascia on the calcaneus.  Full range of motion of the midfoot and hindfoot without disruption.  Negative talar tilt.  Negative anterior drawer.  Negative dorsiflexion eversion.  Negative tib-fib squeeze and calcaneal squeeze.      XRAY : Independent evaluation of the foot x-rays does show evidence of an enthesophyte at lateral view plantar aspect of the calcaneus.  No other acute osseous abnormalities are seen.          ASSESSMENT and PLAN:     Deana was seen today for pain.    Diagnoses and all orders for this visit:    Right foot pain  -     XR Foot Right G/E 3 Views    57-year-old female with a past medical diagnosis of plantar fasciitis, the patient is wearing orthotics that are new as of today, and she does feel little bit better.  She also has a cyst on the bottom portion of her foot.  This is nonpainful however.  After long discussion with the patient, I do think her being more diligent in rolling out the bottom of her foot, to which she is amendable.  I also outlined the typical course of treatment for Planter fasciitis including gel heel cups, if no improvement then followed by either walking boot or a night splint, followed by possible PRP injection.  The patient does want to continue doing the modalities that she has, which I think is completely reasonable.  I have instructed the patient on rolling  on the bottom of her foot before getting up, as well as being diligent with her orthotics.  If she is continue to have pain in the area despite these or the pain returns, we could consider putting her in a cam boot or night splint.  The patient can follow-up with us if she is not having any improvement.  ER/urgent care precautions have been advised.      Options for treatment and/or follow-up care were reviewed with the patient was actively involved in the decision making process. Patient verbalized understanding and was in agreement with the plan.    Rocael Arambula DO  , Sports Medicine  Department of Family Medicine and [facility]

## 2023-05-12 NOTE — PROGRESS NOTES
[facility]  Sports Medicine Clinic  Clinics and Surgery Center           SUBJECTIVE       Deana Garcia is a 57 year old female presenting to clinic today with right foot pain.  Patient has a past medical diagnosis of plantar fasciitis of the right foot, to which she has been managing for quite some time.  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  She is also been less diligent about rolling out and using other modalities to assist with the pain.  She has never been in the night splint.  She never been in a cam boot.  She is also noticed a small marble type structure in the bottom of her foot.  No numbness or tingling.  No swelling.      PMH, Medications and Allergies were reviewed and updated as needed.    ROS:  As noted above otherwise negative.    Patient Active Problem List   Diagnosis     Ear ringing     Nuclear sclerotic cataract of both eyes     Regular astigmatism of both eyes     Trigger thumb of right hand       Current Outpatient Medications   Medication Sig Dispense Refill     albuterol (PROAIR HFA, PROVENTIL HFA, VENTOLIN HFA) 108 (90 BASE) MCG/ACT inhaler Inhale 2 puffs into the lungs every 6 hours       ALPRAZolam (XANAX) 0.25 MG tablet TAKE 1/2 TO 1 TABLET BY MOUTH DAILY AS NEEDED FOR ANXIETY SYMPTOMS.       atenolol (TENORMIN) 50 MG tablet Take 100 mg by mouth daily        hydrochlorothiazide (HYDRODIURIL) 12.5 MG tablet        mirtazapine (REMERON) 7.5 MG tablet        Vitamin D, Cholecalciferol, 25 MCG (1000 UT) CAPS               OBJECTIVE:       Vitals: There were no vitals filed for this visit.  BMI: There is no height or weight on file to calculate BMI.    Gen:  Well nourished and in no acute distress  HEENT: Extraocular movement intact  Neck: Supple  Pulm:  Breathing Comfortably. No increased respiratory effort.  Psych: Euthymic. Appropriately answers  questions    MSK: Right foot and ankle without evidence of an effusion.  No overlying erythema or ecchymosis.  On the bottom of the foot, just distal to the sesamoid, there is a round ovoid mobile nodule felt that does seem consistent with bursal/ganglion cyst.  It is nonpainful.  Tenderness to palpation at the medial aspect of the insertion of the plantar fascia on the calcaneus.  Full range of motion of the midfoot and hindfoot without disruption.  Negative talar tilt.  Negative anterior drawer.  Negative dorsiflexion eversion.  Negative tib-fib squeeze and calcaneal squeeze.      XRAY : Independent evaluation of the foot x-rays does show evidence of an enthesophyte at lateral view plantar aspect of the calcaneus.  No other acute osseous abnormalities are seen.          ASSESSMENT and PLAN:     Deana was seen today for pain.    Diagnoses and all orders for this visit:    Right foot pain  -     XR Foot Right G/E 3 Views    57-year-old female with a past medical diagnosis of plantar fasciitis, the patient is wearing orthotics that are new as of today, and she does feel little bit better.  She also has a cyst on the bottom portion of her foot.  This is nonpainful however.  After long discussion with the patient, I do think her being more diligent in rolling out the bottom of her foot, to which she is amendable.  I also outlined the typical course of treatment for Planter fasciitis including gel heel cups, if no improvement then followed by either walking boot or a night splint, followed by possible PRP injection.  The patient does want to continue doing the modalities that she has, which I think is completely reasonable.  I have instructed the patient on rolling on the bottom of her foot before getting up, as well as being diligent with her orthotics.  If she is continue to have pain in the area despite these or the pain returns, we could consider putting her in a cam boot or night splint.  The patient can follow-up  with us if she is not having any improvement.  ER/urgent care precautions have been advised.      Options for treatment and/or follow-up care were reviewed with the patient was actively involved in the decision making process. Patient verbalized understanding and was in agreement with the plan.    Rocael Arambula DO  , Sports Medicine  Department of Family Medicine and [facility]

## 2023-11-25 ENCOUNTER — HEALTH MAINTENANCE LETTER (OUTPATIENT)
Age: 58
End: 2023-11-25

## 2024-04-18 ENCOUNTER — OFFICE VISIT (OUTPATIENT)
Dept: ORTHOPEDICS | Facility: CLINIC | Age: 59
End: 2024-04-18
Payer: COMMERCIAL

## 2024-04-18 ENCOUNTER — ANCILLARY PROCEDURE (OUTPATIENT)
Dept: GENERAL RADIOLOGY | Facility: CLINIC | Age: 59
End: 2024-04-18
Attending: FAMILY MEDICINE
Payer: COMMERCIAL

## 2024-04-18 DIAGNOSIS — M25.562 LEFT KNEE PAIN: ICD-10-CM

## 2024-04-18 DIAGNOSIS — S83.8X2A ACUTE MENISCAL INJURY OF LEFT KNEE, INITIAL ENCOUNTER: Primary | ICD-10-CM

## 2024-04-18 DIAGNOSIS — M25.562 LEFT KNEE PAIN: Primary | ICD-10-CM

## 2024-04-18 PROCEDURE — 73562 X-RAY EXAM OF KNEE 3: CPT | Mod: LT | Performed by: RADIOLOGY

## 2024-04-18 PROCEDURE — 99214 OFFICE O/P EST MOD 30 MIN: CPT | Performed by: FAMILY MEDICINE

## 2024-04-18 RX ORDER — HYDROCHLOROTHIAZIDE 12.5 MG/1
12.5 TABLET ORAL DAILY
COMMUNITY
Start: 2021-10-15

## 2024-04-18 RX ORDER — MIRTAZAPINE 7.5 MG/1
15 TABLET, FILM COATED ORAL AT BEDTIME
COMMUNITY
Start: 2021-11-11

## 2024-04-18 RX ORDER — VITAMIN B COMPLEX
25 TABLET ORAL DAILY
COMMUNITY
Start: 2020-02-01

## 2024-04-18 RX ORDER — ATENOLOL 100 MG/1
100 TABLET ORAL DAILY
COMMUNITY
Start: 2021-01-31

## 2024-04-18 NOTE — LETTER
4/18/2024      RE: Deana Garcia  [address]  [address]  [facility] [address]     Dear Colleague,    Thank you for referring your patient, Deana Garcia, to the [facility] SP[facility]TS MEDIC[facility]. Please see a copy of my visit note below.      [facility] AND SURGERY CENTER  SPORTS & ORTHOPEDIC CLINIC VISIT     Apr 18, 2024        ASSESSMENT & PLAN    58-year-old with left medial knee pain likely due to degenerative meniscal injury    Reviewed imaging and assessment with patient in detail  Provided with exercise handout.  Offered referral to physical therapy  Right knee brace to use as needed for discomfort.  Continue using topical anti-inflammatory/diclofenac as needed for pain.  May consider steroid injection for pain    Finesse Momin MD  [facility] SPORTS MEDIC[facility]    -----  Chief Complaint   Patient presents with     Left Knee - Pain       SUBJECTIVE  Deana Garcia is a/an 58 year old female who is seen as a self referral for evaluation of  left knee pain.     The patient is seen by themselves.  The patient is Right handed    Onset: 1 week(s) ago. Patient describes injury as going for a walk last week and started walking fast. Pain become worse over the next couple of days.   Location of Pain: left medial knee   Worsened by: Standing, walking, sitting to standing   Better with: Aleve   Treatments tried: Aleve, ice   Associated symptoms: no distal numbness or tingling; denies swelling or warmth    Orthopedic/Surgical history: NO  Social History/Occupation: Admin at the        REVIEW OF SYSTEMS:  Do you have fever, chills, weight loss? No  Do you have any vision problems? No  Do you have any chest pain or edema? No  Do you have any shortness of breath or wheezing?  No  Do you have stomach problems? No  Do you have any numbness or focal weakness? No  Do you have diabetes? No  Do you have problems with bleeding or clotting? No  Do you have an rashes or other  skin lesions? No    OBJECTIVE:  There were no vitals taken for this visit.     Patient is alert, No acute distress, pleasant and conversational.    left knee:   Skin intact. No erythema or ecchymosis.  No effusion or soft tissue swelling.    AROM: Zero to approximately 135  without restriction or reported pain.    Palpation: No medial or lateral facet joint tenderness.  TTP medial joint line    Special Tests:  Negative bounce test, negative forced flexion and positive Elizabeth's.  No ligamentous laxity or pain with valgus or varus stress.  Negative Lachman's, Anterior Drawer and Posterior Drawer     Full Isometric quad strength, extensor mechanism in place     Neurovascularly intact in the lower extremity    Hip and Ankle with full AROM and nontender      RADIOLOGY:    Three-view x-rays left knee are performed and reviewed independently which demonstrate minimal osteophytosis.  No significant loss of joint space.  No acute findings.  See EMR for formal radiology report.           Again, thank you for allowing me to participate in the care of your patient.      Sincerely,    Finesse Momin MD

## 2024-04-18 NOTE — PROGRESS NOTES
[facility] CLINICS AND SURGERY CENTER  SPORTS & ORTHOPEDIC CLINIC VISIT     Apr 18, 2024        ASSESSMENT & PLAN    58-year-old with left medial knee pain likely due to degenerative meniscal injury    Reviewed imaging and assessment with patient in detail  Provided with exercise handout.  Offered referral to physical therapy  Right knee brace to use as needed for discomfort.  Continue using topical anti-inflammatory/diclofenac as needed for pain.  May consider steroid injection for pain    Finesse Momin MD  [facility] SPORTS MEDICINE CLINIC [place]    -----  Chief Complaint   Patient presents with    Left Knee - Pain       SUBJECTIVE  Deana GUSTAVO Garcia is a/an 58 year old female who is seen as a self referral for evaluation of  left knee pain.     The patient is seen by themselves.  The patient is Right handed    Onset: 1 week(s) ago. Patient describes injury as going for a walk last week and started walking fast. Pain become worse over the next couple of days.   Location of Pain: left medial knee   Worsened by: Standing, walking, sitting to standing   Better with: Aleve   Treatments tried: Aleve, ice   Associated symptoms: no distal numbness or tingling; denies swelling or warmth    Orthopedic/Surgical history: NO  Social History/Occupation: Admin at the [employer]       REVIEW OF SYSTEMS:  Do you have fever, chills, weight loss? No  Do you have any vision problems? No  Do you have any chest pain or edema? No  Do you have any shortness of breath or wheezing?  No  Do you have stomach problems? No  Do you have any numbness or focal weakness? No  Do you have diabetes? No  Do you have problems with bleeding or clotting? No  Do you have an rashes or other skin lesions? No    OBJECTIVE:  There were no vitals taken for this visit.     Patient is alert, No acute distress, pleasant and conversational.    left knee:   Skin intact. No erythema or ecchymosis.  No effusion or soft tissue swelling.    AROM: Zero to approximately  135  without restriction or reported pain.    Palpation: No medial or lateral facet joint tenderness.  TTP medial joint line    Special Tests:  Negative bounce test, negative forced flexion and positive Elizabeth's.  No ligamentous laxity or pain with valgus or varus stress.  Negative Lachman's, Anterior Drawer and Posterior Drawer     Full Isometric quad strength, extensor mechanism in place     Neurovascularly intact in the lower extremity    Hip and Ankle with full AROM and nontender      RADIOLOGY:    Three-view x-rays left knee are performed and reviewed independently which demonstrate minimal osteophytosis.  No significant loss of joint space.  No acute findings.  See EMR for formal radiology report.

## 2024-04-18 NOTE — PATIENT INSTRUCTIONS
Knee Exercises (Meniscal tear)    You may do the first 7 exercises right away. You may do the rest of the exercises when the pain in your knee has decreased.    Passive knee extension: Do this exercise if you are unable to extend your knee fully. While lying on your back, place a rolled-up towel under the heel of your injured leg so the heel is about 6 inches off the ground. Relax your leg muscles and let gravity slowly straighten your knee. Try to hold this position for 2 minutes. Repeat 3 times. You may feel some discomfort while doing this exercise. Do the exercise several times a day.  This exercise can also be done while sitting in a chair with your heel on another chair or stool.    Heel slide: Sit on a firm surface with your legs straight in front of you. Slowly slide the heel of the foot on your injured side toward your buttock by pulling your knee toward your chest as you slide the heel. Return to the starting position. Do 2 sets of 15.  Standing calf stretch: Stand facing a wall with your hands on the wall at about eye level. Keep your injured leg back with your heel on the floor. Keep the other leg forward with the knee bent. Turn your back foot slightly inward (as if you were pigeon-toed). Slowly lean into the wall until you feel a stretch in the back of your calf. Hold the stretch for 15 to 30 seconds. Return to the starting position. Repeat 3 times. Do this exercise several times each day.    Hamstring stretch on wall: Lie on your back with your buttocks close to a doorway. Stretch your uninjured leg straight out in front of you on the floor through the doorway. Raise your injured leg and rest it against the wall next to the door frame. Keep your leg as straight as possible. You should feel a stretch in the back of your thigh. Hold this position for 15 to 30 seconds. Repeat 3 times.  Straight leg raise: Lie on your back with your legs straight out in front of you. Bend the knee on your uninjured  side and place the foot flat on the floor. Tighten the thigh muscle on your injured side and lift your leg about 8 inches off the floor. Keep your leg straight and your thigh muscle tight. Slowly lower your leg back down to the floor. Do 2 sets of 15.    Prone hip extension: Lie on your stomach with your legs straight out behind you. Fold your arms under your head and rest your head on your arms. Draw your belly button in towards your spine and tighten your abdominal muscles. Tighten the buttocks and thigh muscles of the leg on your injured side and lift the leg off the floor about 8 inches. Keep your leg straight. Hold for 5 seconds. Then lower your leg and relax. Do 2 sets of 15.  Clam exercise: Lie on your uninjured side with your hips and knees bent and feet together. Slowly raise your top leg toward the ceiling while keeping your heels touching each other. Hold for 2 seconds and lower slowly. Do 2 sets of 15 repetitions.    Wall squat with a ball: Stand with your back, shoulders, and head against a wall. Look straight ahead. Keep your shoulders relaxed and your feet 3 feet (90 centimeters) from the wall and shoulder's width apart. Place a soccer or basketball-sized ball behind your back. Keeping your back against the wall, slowly squat down to a 45-degree angle. Your thighs will not yet be parallel to the floor. Hold this position for 10 seconds and then slowly slide back up the wall. Repeat 10 times. Build up to 2 sets of 15.  Step-up: Stand with the foot of your injured leg on a support 3 to 5 inches (8 to 13 centimeters) high --like a small step or block of wood. Keep your other foot flat on the floor. Shift your weight onto the injured leg on the support. Straighten your injured leg as the other leg comes off the floor. Return to the starting position by bending your injured leg and slowly lowering your uninjured leg back to the floor. Do 2 sets of 15.    Knee stabilization: Wrap a piece of elastic tubing  around the ankle of your uninjured leg. Tie a knot in the other end of the tubing and close it in a door at about ankle height.  Stand facing the door on the leg without tubing (your injured leg) and bend your knee slightly, keeping your thigh muscles tight. Stay in this position while you move the leg with the tubing (the uninjured leg) straight back behind you. Do 2 sets of 15.  Turn 90 degrees so the leg without tubing is closest to the door. Move the leg with tubing away from your body. Do 2 sets of 15.  Turn 90 degrees again so your back is to the door. Move the leg with tubing straight out in front of you. Do 2 sets of 15.  Turn your body 90 degrees again so the leg with tubing is closest to the door. Move the leg with tubing across your body. Do 2 sets of 15.  Hold onto a chair if you need help balancing. This exercise can be made more challenging by standing on a firm pillow or foam mat while you move the leg with tubing.    Resisted terminal knee extension: Make a loop with a piece of elastic tubing by tying a knot in both ends. Close the knot in a door at knee height. Step into the loop with your injured leg so the tubing is around the back of your knee. Lift the other foot off the ground and hold onto a chair for balance, if needed. Bend the knee with tubing about 45 degrees. Slowly straighten your leg, keeping your thigh muscle tight as you do this. Repeat 15 times. Do 2 sets of 15. If you need an easier way to do this, stand on both legs for better support while you do the exercise.  If you have access to a wobble board, do the following exercises:

## 2024-07-22 ENCOUNTER — ANCILLARY PROCEDURE (OUTPATIENT)
Dept: MAMMOGRAPHY | Facility: CLINIC | Age: 59
End: 2024-07-22
Attending: FAMILY MEDICINE
Payer: COMMERCIAL

## 2024-07-22 DIAGNOSIS — Z12.31 VISIT FOR SCREENING MAMMOGRAM: ICD-10-CM

## 2024-07-22 PROCEDURE — 77063 BREAST TOMOSYNTHESIS BI: CPT | Performed by: RADIOLOGY

## 2024-07-22 PROCEDURE — 77067 SCR MAMMO BI INCL CAD: CPT | Performed by: RADIOLOGY

## 2024-07-23 ENCOUNTER — ANCILLARY PROCEDURE (OUTPATIENT)
Dept: MAMMOGRAPHY | Facility: CLINIC | Age: 59
End: 2024-07-23
Attending: FAMILY MEDICINE
Payer: COMMERCIAL

## 2024-07-23 ENCOUNTER — DOCUMENTATION ONLY (OUTPATIENT)
Dept: SLEEP MEDICINE | Facility: CLINIC | Age: 59
End: 2024-07-23

## 2024-07-23 DIAGNOSIS — R92.8 ABNORMAL MAMMOGRAM OF LEFT BREAST: ICD-10-CM

## 2024-07-23 DIAGNOSIS — G47.33 OBSTRUCTIVE SLEEP APNEA (ADULT) (PEDIATRIC): Primary | ICD-10-CM

## 2024-07-23 PROCEDURE — 76882 US LMTD JT/FCL EVL NVASC XTR: CPT | Mod: LT | Performed by: RADIOLOGY

## 2024-11-20 ENCOUNTER — DOCUMENTATION ONLY (OUTPATIENT)
Dept: SLEEP MEDICINE | Facility: CLINIC | Age: 59
End: 2024-11-20
Payer: COMMERCIAL

## 2024-11-21 NOTE — TELEPHONE ENCOUNTER
DIAGNOSIS: (L) little finger pain / self referred / [facility] / no surgery or imaging    APPOINTMENT DATE: 11/30/24   NOTES STATUS DETAILS   OFFICE NOTE from referring provider Internal Self    MEDICATION LIST Internal

## 2024-11-27 DIAGNOSIS — M79.672 LEFT FOOT PAIN: Primary | ICD-10-CM

## 2024-11-30 ENCOUNTER — ANCILLARY PROCEDURE (OUTPATIENT)
Dept: GENERAL RADIOLOGY | Facility: CLINIC | Age: 59
End: 2024-11-30
Attending: FAMILY MEDICINE
Payer: COMMERCIAL

## 2024-11-30 ENCOUNTER — OFFICE VISIT (OUTPATIENT)
Dept: ORTHOPEDICS | Facility: CLINIC | Age: 59
End: 2024-11-30
Payer: COMMERCIAL

## 2024-11-30 ENCOUNTER — PRE VISIT (OUTPATIENT)
Dept: ORTHOPEDICS | Facility: CLINIC | Age: 59
End: 2024-11-30

## 2024-11-30 DIAGNOSIS — S69.92XA FINGER INJURY, LEFT, INITIAL ENCOUNTER: Primary | ICD-10-CM

## 2024-11-30 DIAGNOSIS — M79.672 LEFT FOOT PAIN: ICD-10-CM

## 2024-11-30 PROCEDURE — 73140 X-RAY EXAM OF FINGER(S): CPT | Mod: LT | Performed by: RADIOLOGY

## 2024-11-30 PROCEDURE — 99213 OFFICE O/P EST LOW 20 MIN: CPT | Performed by: FAMILY MEDICINE

## 2024-11-30 NOTE — PROGRESS NOTES
[facility] CLINICS AND SURGERY CENTER  SPORTS & ORTHOPEDIC CLINIC VISIT     Nov 30, 2024        ASSESSMENT & PLAN    59-year-old with left small finger pain of the PIP.  Suspect central slip injury but no tear    Reviewed imaging and assessment with patient in detail  Overall this seems to be healing.  Consider buddy taping or using Coban as needed for activities that may aggravate.  Follow-up only as needed if it fails to improve    Finesse Momin MD  [facility] SPORTS MEDICINE CLINIC [place]    -----  Chief Complaint   Patient presents with    Left Hand - Pain       SUBJECTIVE  Deana V Jose is a/an 59 year old female who is seen as a self referral for evaluation of left hand pain.     The patient is seen by themselves.  The patient is Right handed    Onset: 6-8 week(s) ago. Reports insidious onset without acute precipitating event.  Location of Pain: left 5th finger; PIP joint   Worsened by: Pressure over the area, gripping things   Better with: NA   Treatments tried: no treatment tried to date  Associated symptoms: no distal numbness or tingling; denies swelling or warmth    Orthopedic/Surgical history: NO        REVIEW OF SYSTEMS:  Do you have fever, chills, weight loss? No  Do you have any vision problems? No  Do you have any chest pain or edema? No  Do you have any shortness of breath or wheezing?  No  Do you have stomach problems? No  Do you have any numbness or focal weakness? No  Do you have diabetes? No  Do you have problems with bleeding or clotting? No  Do you have an rashes or other skin lesions? No    OBJECTIVE:  There were no vitals taken for this visit.     General  - alert, pleasant, no distress  CV  - normal radial pulse, cap refill brisk  Musculoskeletal -left small finger  - inspection: no atrophy, normal joint alignment, no swelling  - palpation: TTP over the dorsal aspect of the PIP.  No bony or soft tissue tenderness, no tenderness at the anatomical snuffbox  - ROM:  MCP 90 deg  flexion   0 deg extension    deg flexion   0 deg extension   DIP 80 deg flexion   0 deg extension  - strength: Intact in flexion and extension.  Pain with extending flexed PIP joint.  - special tests:  (-) varus  (-) valgus  Neuro  - no numbness, no motor deficit, grossly normal coordination, normal muscle tone  Skin  - no ecchymosis, erythema, warmth, or induration, no obvious rash        RADIOLOGY:    3 view xrays of left small finger performed and reviewed independently demonstrating no acute fracture. No sig djd. See EMR for formal radiology report.

## 2024-11-30 NOTE — LETTER
11/30/2024      RE: Deana Garcia  [address]  [address]  [facility] [address]     Dear Colleague,    Thank you for referring your patient, Deana Garcia, to the [facility] SP[facility]TS [facility]. Please see a copy of my visit note below.      [facility]  SPORTS & ORTHOPEDIC CLINIC VISIT     Nov 30, 2024        ASSESSMENT & PLAN    59-year-old with left small finger pain of the PIP.  Suspect central slip injury but no tear    Reviewed imaging and assessment with patient in detail  Overall this seems to be healing.  Consider buddy taping or using Coban as needed for activities that may aggravate.  Follow-up only as needed if it fails to improve    Finesse Momin MD  [facility]    -----  Chief Complaint   Patient presents with     Left Hand - Pain       SUBJECTIVE  Deana Garcia is a/an 59 year old female who is seen as a self referral for evaluation of left hand pain.     The patient is seen by themselves.  The patient is Right handed    Onset: 6-8 week(s) ago. Reports insidious onset without acute precipitating event.  Location of Pain: left 5th finger; PIP joint   Worsened by: Pressure over the area, gripping things   Better with: NA   Treatments tried: no treatment tried to date  Associated symptoms: no distal numbness or tingling; denies swelling or warmth    Orthopedic/Surgical history: NO        REVIEW OF SYSTEMS:  Do you have fever, chills, weight loss? No  Do you have any vision problems? No  Do you have any chest pain or edema? No  Do you have any shortness of breath or wheezing?  No  Do you have stomach problems? No  Do you have any numbness or focal weakness? No  Do you have diabetes? No  Do you have problems with bleeding or clotting? No  Do you have an rashes or other skin lesions? No    OBJECTIVE:  There were no vitals taken for this visit.     General  - alert, pleasant, no distress  CV  - normal radial pulse,  cap refill brisk  Musculoskeletal -left small finger  - inspection: no atrophy, normal joint alignment, no swelling  - palpation: TTP over the dorsal aspect of the PIP.  No bony or soft tissue tenderness, no tenderness at the anatomical snuffbox  - ROM:  MCP 90 deg flexion   0 deg extension    deg flexion   0 deg extension   DIP 80 deg flexion   0 deg extension  - strength: Intact in flexion and extension.  Pain with extending flexed PIP joint.  - special tests:  (-) varus  (-) valgus  Neuro  - no numbness, no motor deficit, grossly normal coordination, normal muscle tone  Skin  - no ecchymosis, erythema, warmth, or induration, no obvious rash        RADIOLOGY:    3 view xrays of left small finger performed and reviewed independently demonstrating no acute fracture. No sig djd. See EMR for formal radiology report.              Again, thank you for allowing me to participate in the care of your patient.      Sincerely,    Finesse Momin MD

## 2025-01-04 ENCOUNTER — HEALTH MAINTENANCE LETTER (OUTPATIENT)
Age: 60
End: 2025-01-04

## 2025-01-29 NOTE — TELEPHONE ENCOUNTER
Spoke to pt at 0915  reivewed likely dilated exam, glasses updat/refraction-- reviewed 1.5 hours plus or minus 1/2 hour would be my best estimate for 2-3 week post op cataract surgery    Pt verbally demonstrated understanding    Gabriel Mendoza RN 9:22 AM 06/29/21          [facility]    Phone Message    May a detailed message be left on voicemail: yes     Reason for Call: Other: Deana calling to request a call back. She would like to speak to her care team in regards to her upcoming post op apt 07/05. Please call her back to discuss.      Action Taken: Message routed to:  [facility] ([facility]  eye     Travel Screening: Not Applicable                                                                         No

## 2025-05-09 ASSESSMENT — SLEEP AND FATIGUE QUESTIONNAIRES
HOW LIKELY ARE YOU TO NOD OFF OR FALL ASLEEP WHEN YOU ARE A PASSENGER IN A CAR FOR AN HOUR WITHOUT A BREAK: WOULD NEVER DOZE
HOW LIKELY ARE YOU TO NOD OFF OR FALL ASLEEP WHILE SITTING AND READING: WOULD NEVER DOZE
HOW LIKELY ARE YOU TO NOD OFF OR FALL ASLEEP WHILE SITTING INACTIVE IN A PUBLIC PLACE: WOULD NEVER DOZE
HOW LIKELY ARE YOU TO NOD OFF OR FALL ASLEEP WHILE LYING DOWN TO REST IN THE AFTERNOON WHEN CIRCUMSTANCES PERMIT: SLIGHT CHANCE OF DOZING
HOW LIKELY ARE YOU TO NOD OFF OR FALL ASLEEP WHILE SITTING AND TALKING TO SOMEONE: WOULD NEVER DOZE
HOW LIKELY ARE YOU TO NOD OFF OR FALL ASLEEP WHILE WATCHING TV: SLIGHT CHANCE OF DOZING
HOW LIKELY ARE YOU TO NOD OFF OR FALL ASLEEP WHILE SITTING QUIETLY AFTER LUNCH WITHOUT ALCOHOL: WOULD NEVER DOZE
HOW LIKELY ARE YOU TO NOD OFF OR FALL ASLEEP IN A CAR, WHILE STOPPED FOR A FEW MINUTES IN TRAFFIC: WOULD NEVER DOZE

## 2025-05-13 ENCOUNTER — OFFICE VISIT (OUTPATIENT)
Dept: SLEEP MEDICINE | Facility: CLINIC | Age: 60
End: 2025-05-13
Payer: COMMERCIAL

## 2025-05-13 VITALS
DIASTOLIC BLOOD PRESSURE: 82 MMHG | OXYGEN SATURATION: 96 % | HEIGHT: 66 IN | HEART RATE: 80 BPM | WEIGHT: 228.2 LBS | SYSTOLIC BLOOD PRESSURE: 125 MMHG | BODY MASS INDEX: 36.67 KG/M2

## 2025-05-13 DIAGNOSIS — G47.33 OSA (OBSTRUCTIVE SLEEP APNEA): Primary | ICD-10-CM

## 2025-05-13 PROCEDURE — 99203 OFFICE O/P NEW LOW 30 MIN: CPT | Performed by: INTERNAL MEDICINE

## 2025-05-13 PROCEDURE — 3074F SYST BP LT 130 MM HG: CPT | Performed by: INTERNAL MEDICINE

## 2025-05-13 PROCEDURE — 3079F DIAST BP 80-89 MM HG: CPT | Performed by: INTERNAL MEDICINE

## 2025-05-13 RX ORDER — PRAZOSIN HYDROCHLORIDE 2 MG/1
2 CAPSULE ORAL
COMMUNITY
Start: 2025-02-20

## 2025-05-13 NOTE — NURSING NOTE
"Chief Complaint   Patient presents with    CPAP Follow Up       Initial /82   Pulse 80   Ht 1.676 m (5' 5.98\")   Wt 103.5 kg (228 lb 3.2 oz)   SpO2 96%   BMI 36.85 kg/m   Estimated body mass index is 36.85 kg/m  as calculated from the following:    Height as of this encounter: 1.676 m (5' 5.98\").    Weight as of this encounter: 103.5 kg (228 lb 3.2 oz).    Medication Reconciliation: complete  ESS: 2  Neck circumference: 39 centimeters.    DME: [facility]    GIL Recinos      "

## 2025-05-13 NOTE — PROGRESS NOTES
Obstructive Sleep Apnea - PAP Follow-Up Visit:    Chief Complaint   Patient presents with    CPAP Follow Up       Deana Garcia comes in today for follow-up of their severe sleep apnea, managed with CPAP.     4/8/2022 [place] [facility] Sleep Study (219.0 lbs) - AHI 40.0, RDI 41.2, Supine AHI 62.9, REM AHI -, Low O2 84.0%, Time Spent <=88% 21.1 minutes / Time Spent <=89% 34.2 minutes.    Do you use a CPAP Machine at home: (Patient-Rptd) Yes  Overall, on a scale of 0-10 how would you rate your CPAP (0 poor, 10 great): (Patient-Rptd) 10  Is your mask comfortable: (Patient-Rptd) Yes  If not, why:    Is you mask leaking: (Patient-Rptd) No  If yes, where do you feel it:    How many night per week does the mask leak (0-7):    Do you notice snoring with mask on: (Patient-Rptd) No  Do you notice gasping arousals with mask on: (Patient-Rptd) No  Are you having significant oral or nasal dryness: (Patient-Rptd) No  Is the pressure setting comfortable: (Patient-Rptd) Yes  In not, why:    What type of mask do you use: (Patient-Rptd) Nasal Pillow  What is your typical bedtime: (Patient-Rptd) 9:00-9:30 pm  How long does it take you to go to sleep on PAP therapy: (Patient-Rptd) not long currently  What time do you typically get out of bed for the day: (Patient-Rptd) 5:-6:00 am  How many hours on average per night are you using PAP therapy: (Patient-Rptd) 7-8 hours  How many hours are you sleeping per night: (Patient-Rptd) 7-8 hours  Do you feel well rested in the morning: (Patient-Rptd) No    ResMed     Auto-PAP 5.0 - 15.0 cmH2O 30 day usage data:    96% of days with > 4 hours of use. 0/30 days with no use.   Average use 449 minutes per day.   95%ile Leak 0 L/min.   CPAP 95% pressure 9.7 cm.   AHI 0.42 events per hour.     EPWORTH SLEEPINESS SCALE         5/9/2025     2:52 PM    Maceo Sleepiness Scale ( JOI Gonzales  [id number]<br>ESS - [place]/English - Final version - 21 Nov 07 - [facility] [place].)   Sitting and reading Would  never doze   Watching TV Slight chance of dozing   Sitting, inactive in a public place (e.g. a theatre or a meeting) Would never doze   As a passenger in a car for an hour without a break Would never doze   Lying down to rest in the afternoon when circumstances permit Slight chance of dozing   Sitting and talking to someone Would never doze   Sitting quietly after a lunch without alcohol Would never doze   In a car, while stopped for a few minutes in traffic Would never doze   Waldorf Score (MC) 2   Waldorf Score (Sleep) 2        Patient-reported         INSOMNIA SEVERITY INDEX (LIZETH)          5/9/2025     2:47 PM   Insomnia Severity Index (LIZETH)   Difficulty falling asleep 1   Difficulty staying asleep 0   Problems waking up too early 1   How SATISFIED/DISSATISFIED are you with your CURRENT sleep pattern? 2   How NOTICEABLE to others do you think your sleep problem is in terms of impairing the quality of your life? 0   How WORRIED/DISTRESSED are you about your current sleep problem? 0   To what extent do you consider your sleep problem to INTERFERE with your daily functioning (e.g. daytime fatigue, mood, ability to function at work/daily chores, concentration, memory, mood, etc.) CURRENTLY? 1   LIZETH Total Score 5        Patient-reported         Guidelines for Scoring/Interpretation:  Total score categories:  0-7 = No clinically significant insomnia   8-14 = Subthreshold insomnia   15-21 = Clinical insomnia (moderate severity)  22-28 = Clinical insomnia (severe)  Used via courtesy of www.[employer].va.gov with permission from Juancarlos Casillas PhD., [facility]    Problem List:  Patient Active Problem List    Diagnosis Date Noted    Trigger thumb of right hand 09/09/2022     Priority: Medium    Nuclear sclerotic cataract of both eyes 05/24/2021     Priority: Medium     Added automatically from request for surgery [id number]      Regular astigmatism of both eyes 05/24/2021     Priority: Medium     Added automatically  "from request for surgery [id number]      Ear ringing 09/09/2013     Priority: Medium          /82   Pulse 80   Ht 1.676 m (5' 5.98\")   Wt 103.5 kg (228 lb 3.2 oz)   SpO2 96%   BMI 36.85 kg/m      Impression/Plan:     Severe obstructive sleep apnea.     Patient is using CPAP regularly and is continuing to benefit from treatment. I reviewed download data and graphs from her device for last 30 days. Regular compliance and normal residual AHI is demonstrated, confirming adequate treatment of sleep apnea.     PTSD nightmares are better after she started Prazosin.     Plan:     Continue auto PAP therapy 5-15 cm H2O    Deana Garcia will follow up in about 2 year(s).         "

## 2025-05-13 NOTE — PATIENT INSTRUCTIONS
Your There is no height or weight on file to calculate BMI.  Weight management is a personal decision.  If you are interested in exploring weight loss strategies, the following discussion covers the approaches that may be successful. Body mass index (BMI) is one way to tell whether you are at a healthy weight, overweight, or obese. It measures your weight in relation to your height.  A BMI of 18.5 to 24.9 is in the healthy range. A person with a BMI of 25 to 29.9 is considered overweight, and someone with a BMI of 30 or greater is considered obese. More than two-thirds of American adults are considered overweight or obese.  Being overweight or obese increases the risk for further weight gain. Excess weight may lead to heart disease and diabetes.  Creating and following plans for healthy eating and physical activity may help you improve your health.  Weight control is part of healthy lifestyle and includes exercise, emotional health, and healthy eating habits. Careful eating habits lifelong are the mainstay of weight control. Though there are significant health benefits from weight loss, long-term weight loss with diet alone may be very difficult to achieve- studies show long-term success with dietary management in less than 10% of people. Attaining a healthy weight may be especially difficult to achieve in those with severe obesity. In some cases, medications, devices and surgical management might be considered.  What can you do?  If you are overweight or obese and are interested in methods for weight loss, you should discuss this with your provider.   Consider reducing daily calorie intake by 500 calories.   Keep a food journal.   Avoiding skipping meals, consider cutting portions instead.    Diet combined with exercise helps maintain muscle while optimizing fat loss. Strength training is particularly important for building and maintaining muscle mass. Exercise helps reduce stress, increase energy, and improves  fitness. Increasing exercise without diet control, however, may not burn enough calories to loose weight.     Start walking three days a week 10-20 minutes at a time  Work towards walking thirty minutes five days a week   Eventually, increase the speed of your walking for 1-2 minutes at time    In addition, we recommend that you review healthy lifestyles and methods for weight loss available through the National Institutes of Health patient information sites:  http://win.niddk.nih.gov/publications/index.htm    And look into health and wellness programs that may be available through your health insurance provider, employer, local community center, or [employer] club.

## 2025-06-23 ENCOUNTER — RESULTS FOLLOW-UP (OUTPATIENT)
Dept: CARDIOLOGY | Facility: CLINIC | Age: 60
End: 2025-06-23

## 2025-06-23 ENCOUNTER — OFFICE VISIT (OUTPATIENT)
Dept: CARDIOLOGY | Facility: CLINIC | Age: 60
End: 2025-06-23
Payer: COMMERCIAL

## 2025-06-23 VITALS
WEIGHT: 229 LBS | DIASTOLIC BLOOD PRESSURE: 85 MMHG | HEIGHT: 66 IN | HEART RATE: 82 BPM | OXYGEN SATURATION: 96 % | BODY MASS INDEX: 36.8 KG/M2 | SYSTOLIC BLOOD PRESSURE: 135 MMHG

## 2025-06-23 DIAGNOSIS — R00.2 PALPITATIONS: Primary | ICD-10-CM

## 2025-06-23 PROCEDURE — G2211 COMPLEX E/M VISIT ADD ON: HCPCS | Performed by: INTERNAL MEDICINE

## 2025-06-23 PROCEDURE — 3079F DIAST BP 80-89 MM HG: CPT | Performed by: INTERNAL MEDICINE

## 2025-06-23 PROCEDURE — 99204 OFFICE O/P NEW MOD 45 MIN: CPT | Performed by: INTERNAL MEDICINE

## 2025-06-23 PROCEDURE — 93000 ELECTROCARDIOGRAM COMPLETE: CPT | Performed by: INTERNAL MEDICINE

## 2025-06-23 PROCEDURE — 3075F SYST BP GE 130 - 139MM HG: CPT | Performed by: INTERNAL MEDICINE

## 2025-06-23 RX ORDER — HYDROCHLOROTHIAZIDE 25 MG/1
25 TABLET ORAL DAILY
COMMUNITY
Start: 2024-11-01

## 2025-06-23 RX ORDER — CYCLOSPORINE 0.5 MG/ML
1 EMULSION OPHTHALMIC
COMMUNITY
Start: 2024-07-23

## 2025-06-23 RX ORDER — LORAZEPAM 0.5 MG/1
0.5 TABLET ORAL EVERY 6 HOURS PRN
COMMUNITY
Start: 2024-03-01

## 2025-06-23 RX ORDER — MIRTAZAPINE 15 MG/1
15 TABLET, FILM COATED ORAL AT BEDTIME
COMMUNITY
Start: 2024-03-01

## 2025-06-23 NOTE — PROGRESS NOTES
CARDIOLOGY CONSULT    REASON FOR CONSULT: Hypertension, palpitations    PRIMARY CARE PHYSICIAN:  Physician No Ref-Primary    HISTORY OF PRESENT ILLNESS:  59-year-old female seen for hypertension and palpitations.    She has a long history of hypertension.  She was on atenolol for 10 or more years.  This was stopped in December, it was replaced by prazosin, which also can help with PTSD per her psychologist.  Blood pressure has been well-controlled, mostly 100 teens to 130s.  Heart rate on atenolol was in the 40s, now it is in the 60s to 70s.    Since going off atenolol she has had some intermittent palpitations.  These can last a few minutes, they come on randomly, not with exertion.  She may feel them about once per week on average.  She feels an ache in her upper shoulders as well, but no central chest pain.  She admits to having a tremendous amount of stress in her life recently.  She does some exercise classes and feels well during this.    PAST MEDICAL HISTORY:  Past Medical History:   Diagnosis Date    Anxiety     Asthma     Depression     High blood pressure     Hypertension     Major depressive disorder, recurrent episode, mild     PONV (postoperative nausea and vomiting)     Ringing in ears     Rotator cuff injury     left       MEDICATIONS:  Current Outpatient Medications   Medication Sig Dispense Refill    albuterol (PROAIR HFA, PROVENTIL HFA, VENTOLIN HFA) 108 (90 BASE) MCG/ACT inhaler Inhale 2 puffs into the lungs every 6 hours      cycloSPORINE (RESTASIS) 0.05 % ophthalmic emulsion 1 drop.      hydrochlorothiazide (HYDRODIURIL) 25 MG tablet Take 25 mg by mouth daily.      LORazepam (ATIVAN) 0.5 MG tablet Take 0.5 mg by mouth every 6 hours as needed for anxiety.      mirtazapine (REMERON) 15 MG tablet Take 15 mg by mouth at bedtime.      prazosin (MINIPRESS) 2 MG capsule 2 mg.      Vitamin D, Cholecalciferol, 25 MCG (1000 UT) CAPS       Vitamin D3 (CHOLECALCIFEROL) 25 mcg (1000 units) tablet Take 25 mcg  by mouth daily       No current facility-administered medications for this visit.       ALLERGIES:  Allergies   Allergen Reactions    Bees Swelling     Localized swelling    Dust Mites     Scopolamine Other (See Comments)     Blurred vision, dry mouth, motor skills affected    Sulfa Antibiotics Nausea and Vomiting    Ivp Dye [Contrast Dye] Rash     Needs steroid prior         SOCIAL HISTORY:  I have reviewed this patient's social history and updated it with pertinent information if needed. Deana Garcia  reports that she has never smoked. She has never used smokeless tobacco. She reports current alcohol use. She reports that she does not use drugs.    FAMILY HISTORY:  I have reviewed this patient's family history and updated it with pertinent information if needed.   Family History   Problem Relation Age of Onset    Glaucoma No family hx of     Macular Degeneration No family hx of        REVIEW OF SYSTEMS:  Constitutional:  No weight loss, fever, chills  HEENT:  Eyes:  No visual loss, blurred vision, double vision or yellow sclerae. No hearing loss, sneezing, congestion, runny nose or sore throat.  Skin:  No rash or itching.  Cardiovascular: per HPI  Respiratory: per HPI  GI:  No anorexia, nausea, vomiting or diarrhea. No abdominal pain or blood.  :  No dysurea, hematuria  Neurologic:  No headache, paralysis, ataxia, numbness or tingling in the extremities. No change in bowel or bladder control.  Musculoskeletal:  No muscle pain  Hematologic:  No bleeding or bruising.  Lymphatics:  No enlarged nodes. No history of splenectomy.  Endocrine:  No reports of sweating, cold or heat intolerance. No polyuria or polydipsia.  Allergies:  No history of asthma, hives, eczema or rhinitis.    PHYSICAL EXAM:      BP: 135/85 Pulse: 82     SpO2: 96 % (on room air)      Vital Signs with Ranges  Pulse:  [82] 82  BP: (135)/(85) 135/85  SpO2:  [96 %] 96 %  229 lbs 0 oz    Constitutional: awake, alert, no distress  Eyes: PERRL, sclera  nonicteric  ENT: trachea midline  Respiratory: Lungs clear  Cardiovascular: Regular rate and rhythm, no murmurs, no ectopy  GI: nondistended, nontender, bowel sounds present  Lymph/Hematologic: no lymphadenopathy  Skin: dry, no rash  Musculoskeletal: good muscle tone, strength 5/5 in upper and lower extremities  Neurologic: no focal deficits  Neuropsychiatric: appropriate affact    DATA:  Labs:     EKG: June 2025: Sinus rhythm, normal EKG    ASSESSMENT:  59-year-old female seen for hypertension and palpitations.  Her blood pressure seems well-controlled.  Suspect her palpitations are noncardiac.  It is interesting that they came on after stopping atenolol, she may have some ectopy.  Less likely would be SVT or A-fib.  Zio monitor will be ordered to hopefully capture her symptoms and sort out whether this may be cardiac or more stress and thinks anxiety related.  For now we will keep her blood pressure medications the same.    The longitudinal plan of care for the diagnosis(es)/condition(s) as documented were addressed during this visit. Due to the added complexity in care, I will continue to support (patient's name) in the subsequent management and with ongoing continuity of care.     RECOMMENDATIONS:  1.  Palpitations  - 14-day Zio monitor, okay to take off early if her symptoms are captured    2.  Hypertension  - Continue prazosin and hydrochlorothiazide    Follow-up as needed based on test results.    Jose Garcia MD  Cardiology - [facility]  Pager:  [phone]  Text Page  June 23, 2025

## 2025-06-23 NOTE — LETTER
6/23/2025    Physician No Ref-Primary  No address on file    RE: Deanamary kate Garcia       Dear Colleague,     I had the pleasure of seeing Deana GUSTAVO Jose in the [facility].  CARDIOLOGY CONSULT    REASON FOR CONSULT: Hypertension, palpitations    PRIMARY CARE PHYSICIAN:  Physician No Ref-Primary    HISTORY OF PRESENT ILLNESS:  59-year-old female seen for hypertension and palpitations.    She has a long history of hypertension.  She was on atenolol for 10 or more years.  This was stopped in December, it was replaced by prazosin, which also can help with PTSD per her psychologist.  Blood pressure has been well-controlled, mostly 100 teens to 130s.  Heart rate on atenolol was in the 40s, now it is in the 60s to 70s.    Since going off atenolol she has had some intermittent palpitations.  These can last a few minutes, they come on randomly, not with exertion.  She may feel them about once per week on average.  She feels an ache in her upper shoulders as well, but no central chest pain.  She admits to having a tremendous amount of stress in her life recently.  She does some exercise classes and feels well during this.    PAST MEDICAL HISTORY:  Past Medical History:   Diagnosis Date     Anxiety      Asthma      Depression      High blood pressure      Hypertension      Major depressive disorder, recurrent episode, mild      PONV (postoperative nausea and vomiting)      Ringing in ears      Rotator cuff injury     left       MEDICATIONS:  Current Outpatient Medications   Medication Sig Dispense Refill     albuterol (PROAIR HFA, PROVENTIL HFA, VENTOLIN HFA) 108 (90 BASE) MCG/ACT inhaler Inhale 2 puffs into the lungs every 6 hours       cycloSPORINE (RESTASIS) 0.05 % ophthalmic emulsion 1 drop.       hydrochlorothiazide (HYDRODIURIL) 25 MG tablet Take 25 mg by mouth daily.       LORazepam (ATIVAN) 0.5 MG tablet Take 0.5 mg by mouth every 6 hours as needed for anxiety.       mirtazapine (REMERON) 15 MG tablet  Take 15 mg by mouth at bedtime.       prazosin (MINIPRESS) 2 MG capsule 2 mg.       Vitamin D, Cholecalciferol, 25 MCG (1000 UT) CAPS        Vitamin D3 (CHOLECALCIFEROL) 25 mcg (1000 units) tablet Take 25 mcg by mouth daily       No current facility-administered medications for this visit.       ALLERGIES:  Allergies   Allergen Reactions     Bees Swelling     Localized swelling     Dust Mites      Scopolamine Other (See Comments)     Blurred vision, dry mouth, motor skills affected     Sulfa Antibiotics Nausea and Vomiting     Ivp Dye [Contrast Dye] Rash     Needs steroid prior         SOCIAL HISTORY:  I have reviewed this patient's social history and updated it with pertinent information if needed. Deana Garcia  reports that she has never smoked. She has never used smokeless tobacco. She reports current alcohol use. She reports that she does not use drugs.    FAMILY HISTORY:  I have reviewed this patient's family history and updated it with pertinent information if needed.   Family History   Problem Relation Age of Onset     Glaucoma No family hx of      Macular Degeneration No family hx of        REVIEW OF SYSTEMS:  Constitutional:  No weight loss, fever, chills  HEENT:  Eyes:  No visual loss, blurred vision, double vision or yellow sclerae. No hearing loss, sneezing, congestion, runny nose or sore throat.  Skin:  No rash or itching.  Cardiovascular: per HPI  Respiratory: per HPI  GI:  No anorexia, nausea, vomiting or diarrhea. No abdominal pain or blood.  :  No dysurea, hematuria  Neurologic:  No headache, paralysis, ataxia, numbness or tingling in the extremities. No change in bowel or bladder control.  Musculoskeletal:  No muscle pain  Hematologic:  No bleeding or bruising.  Lymphatics:  No enlarged nodes. No history of splenectomy.  Endocrine:  No reports of sweating, cold or heat intolerance. No polyuria or polydipsia.  Allergies:  No history of asthma, hives, eczema or rhinitis.    PHYSICAL EXAM:       BP: 135/85 Pulse: 82     SpO2: 96 % (on room air)      Vital Signs with Ranges  Pulse:  [82] 82  BP: (135)/(85) 135/85  SpO2:  [96 %] 96 %  229 lbs 0 oz    Constitutional: awake, alert, no distress  Eyes: PERRL, sclera nonicteric  ENT: trachea midline  Respiratory: Lungs clear  Cardiovascular: Regular rate and rhythm, no murmurs, no ectopy  GI: nondistended, nontender, bowel sounds present  Lymph/Hematologic: no lymphadenopathy  Skin: dry, no rash  Musculoskeletal: good muscle tone, strength 5/5 in upper and lower extremities  Neurologic: no focal deficits  Neuropsychiatric: appropriate affact    DATA:  Labs:     EKG: June 2025: Sinus rhythm, normal EKG    ASSESSMENT:  59-year-old female seen for hypertension and palpitations.  Her blood pressure seems well-controlled.  Suspect her palpitations are noncardiac.  It is interesting that they came on after stopping atenolol, she may have some ectopy.  Less likely would be SVT or A-fib.  Zio monitor will be ordered to hopefully capture her symptoms and sort out whether this may be cardiac or more stress and thinks anxiety related.  For now we will keep her blood pressure medications the same.    The longitudinal plan of care for the diagnosis(es)/condition(s) as documented were addressed during this visit. Due to the added complexity in care, I will continue to support (patient's name) in the subsequent management and with ongoing continuity of care.     RECOMMENDATIONS:  1.  Palpitations  - 14-day Zio monitor, okay to take off early if her symptoms are captured    2.  Hypertension  - Continue prazosin and hydrochlorothiazide    Follow-up as needed based on test results.    Jose Garcia MD  Cardiology - [facility]  Pager:  [phone]  Text Page  June 23, 2025        Thank you for allowing me to participate in the care of your patient.      Sincerely,     Jose Garcia MD     [facility]  cc:    Referred Self, MD  No address on file

## 2025-06-23 NOTE — PATIENT INSTRUCTIONS
Ryano patch  Will order a Zio monitor.  This is a heart monitor that continuously records all your heartbeats.  If you have any symptoms, there is a button you can push that records when you had your symptoms.  The monitor is returned to the company in the mail and the report is sent to us.  This will show what your average heart rate is and if there are any rhythm issues with the heart including when you may have reported any symptoms.  You are encouraged to do all your regular activities while wearing this monitor.    The monitor is sent to your address by the company, it typically arrives within 1 week.  There are basic instructions to putting it on and activating it.  When you complete the monitor, it comes with a box to send it back in the mail to the company.  We typically received the report within 1 to 2 weeks.

## 2025-06-24 ENCOUNTER — ORDERS ONLY (AUTO-RELEASED) (OUTPATIENT)
Dept: CARDIOLOGY | Facility: CLINIC | Age: 60
End: 2025-06-24
Payer: COMMERCIAL

## 2025-06-24 DIAGNOSIS — R00.2 PALPITATIONS: ICD-10-CM

## 2025-06-26 ENCOUNTER — MYC MEDICAL ADVICE (OUTPATIENT)
Dept: CARDIOLOGY | Facility: CLINIC | Age: 60
End: 2025-06-26
Payer: COMMERCIAL

## (undated) DEVICE — EYE KNIFE STILETTO VISITEC 1.1MM ANG 45DEG SIDEPORT 376620

## (undated) DEVICE — LINEN TOWEL PACK X5 5464

## (undated) DEVICE — EYE CANN IRR 25GA CYSTOTOME 581610

## (undated) DEVICE — EYE PACK CUSTOM ANTERIOR 30DEG TIP CENTURION PPK6682-04

## (undated) DEVICE — EYE KNIFE SLIT XSTAR VISITEC 2.6MM 45DEG 373726

## (undated) DEVICE — GLOVE PROTEXIS MICRO 6.5  2D73PM65

## (undated) DEVICE — EYE TIP IRRIGATION & ASPIRATION POLYMER CVD 0.3MM 8065751512

## (undated) DEVICE — EYE SHIELD PLASTIC

## (undated) DEVICE — PACK CATARACT CUSTOM ASC SEY15CPUMC

## (undated) DEVICE — SOL WATER IRRIG 500ML BOTTLE 2F7113

## (undated) DEVICE — EYE CANN IRR 27GA ANTERIOR CHAMBER 581280

## (undated) RX ORDER — LIDOCAINE HYDROCHLORIDE 10 MG/ML
INJECTION, SOLUTION EPIDURAL; INFILTRATION; INTRACAUDAL; PERINEURAL
Status: DISPENSED
Start: 2022-10-12